# Patient Record
Sex: FEMALE | Race: WHITE | NOT HISPANIC OR LATINO
[De-identification: names, ages, dates, MRNs, and addresses within clinical notes are randomized per-mention and may not be internally consistent; named-entity substitution may affect disease eponyms.]

---

## 2017-03-08 ENCOUNTER — APPOINTMENT (OUTPATIENT)
Dept: INTERNAL MEDICINE | Facility: CLINIC | Age: 82
End: 2017-03-08

## 2017-03-20 ENCOUNTER — APPOINTMENT (OUTPATIENT)
Dept: INTERNAL MEDICINE | Facility: CLINIC | Age: 82
End: 2017-03-20

## 2017-06-27 ENCOUNTER — APPOINTMENT (OUTPATIENT)
Dept: INTERNAL MEDICINE | Facility: CLINIC | Age: 82
End: 2017-06-27

## 2017-08-24 ENCOUNTER — TRANSCRIPTION ENCOUNTER (OUTPATIENT)
Age: 82
End: 2017-08-24

## 2017-09-14 ENCOUNTER — APPOINTMENT (OUTPATIENT)
Dept: INTERNAL MEDICINE | Facility: CLINIC | Age: 82
End: 2017-09-14
Payer: MEDICARE

## 2017-09-14 PROCEDURE — 99214 OFFICE O/P EST MOD 30 MIN: CPT | Mod: 25

## 2017-09-14 PROCEDURE — 36415 COLL VENOUS BLD VENIPUNCTURE: CPT

## 2017-11-11 ENCOUNTER — APPOINTMENT (OUTPATIENT)
Dept: DERMATOLOGY | Facility: CLINIC | Age: 82
End: 2017-11-11
Payer: MEDICARE

## 2017-11-11 PROCEDURE — 17000 DESTRUCT PREMALG LESION: CPT

## 2017-11-11 PROCEDURE — 99214 OFFICE O/P EST MOD 30 MIN: CPT | Mod: 25

## 2017-11-11 PROCEDURE — 17003 DESTRUCT PREMALG LES 2-14: CPT

## 2017-12-11 ENCOUNTER — INPATIENT (INPATIENT)
Facility: HOSPITAL | Age: 82
LOS: 15 days | Discharge: NURSING, REHAB OR LONG-TERM CARE | DRG: 481 | End: 2017-12-27
Attending: INTERNAL MEDICINE | Admitting: HOSPITALIST
Payer: MEDICARE

## 2017-12-11 VITALS — HEIGHT: 66 IN | WEIGHT: 139.99 LBS

## 2017-12-11 DIAGNOSIS — Z29.9 ENCOUNTER FOR PROPHYLACTIC MEASURES, UNSPECIFIED: ICD-10-CM

## 2017-12-11 DIAGNOSIS — I25.10 ATHEROSCLEROTIC HEART DISEASE OF NATIVE CORONARY ARTERY WITHOUT ANGINA PECTORIS: ICD-10-CM

## 2017-12-11 DIAGNOSIS — Z95.1 PRESENCE OF AORTOCORONARY BYPASS GRAFT: Chronic | ICD-10-CM

## 2017-12-11 DIAGNOSIS — R55 SYNCOPE AND COLLAPSE: ICD-10-CM

## 2017-12-11 DIAGNOSIS — S72.001A FRACTURE OF UNSPECIFIED PART OF NECK OF RIGHT FEMUR, INITIAL ENCOUNTER FOR CLOSED FRACTURE: ICD-10-CM

## 2017-12-11 DIAGNOSIS — I25.810 ATHEROSCLEROSIS OF CORONARY ARTERY BYPASS GRAFT(S) WITHOUT ANGINA PECTORIS: ICD-10-CM

## 2017-12-11 DIAGNOSIS — I10 ESSENTIAL (PRIMARY) HYPERTENSION: ICD-10-CM

## 2017-12-11 LAB
ABO RH CONFIRMATION: SIGNIFICANT CHANGE UP
ALBUMIN SERPL ELPH-MCNC: 3 G/DL — LOW (ref 3.3–5.2)
ALP SERPL-CCNC: 47 U/L — SIGNIFICANT CHANGE UP (ref 40–120)
ALT FLD-CCNC: 18 U/L — SIGNIFICANT CHANGE UP
ANION GAP SERPL CALC-SCNC: 17 MMOL/L — SIGNIFICANT CHANGE UP (ref 5–17)
ANION GAP SERPL CALC-SCNC: 17 MMOL/L — SIGNIFICANT CHANGE UP (ref 5–17)
APTT BLD: 28 SEC — SIGNIFICANT CHANGE UP (ref 27.5–37.4)
AST SERPL-CCNC: 24 U/L — SIGNIFICANT CHANGE UP
BASOPHILS # BLD AUTO: 0.1 K/UL — SIGNIFICANT CHANGE UP (ref 0–0.2)
BASOPHILS NFR BLD AUTO: 0.4 % — SIGNIFICANT CHANGE UP (ref 0–2)
BILIRUB SERPL-MCNC: 0.9 MG/DL — SIGNIFICANT CHANGE UP (ref 0.4–2)
BLD GP AB SCN SERPL QL: SIGNIFICANT CHANGE UP
BUN SERPL-MCNC: 31 MG/DL — HIGH (ref 8–20)
BUN SERPL-MCNC: 33 MG/DL — HIGH (ref 8–20)
CALCIUM SERPL-MCNC: 7.8 MG/DL — LOW (ref 8.6–10.2)
CALCIUM SERPL-MCNC: 9.2 MG/DL — SIGNIFICANT CHANGE UP (ref 8.6–10.2)
CHLORIDE SERPL-SCNC: 101 MMOL/L — SIGNIFICANT CHANGE UP (ref 98–107)
CHLORIDE SERPL-SCNC: 104 MMOL/L — SIGNIFICANT CHANGE UP (ref 98–107)
CO2 SERPL-SCNC: 22 MMOL/L — SIGNIFICANT CHANGE UP (ref 22–29)
CO2 SERPL-SCNC: 23 MMOL/L — SIGNIFICANT CHANGE UP (ref 22–29)
CREAT SERPL-MCNC: 0.95 MG/DL — SIGNIFICANT CHANGE UP (ref 0.5–1.3)
CREAT SERPL-MCNC: 1.01 MG/DL — SIGNIFICANT CHANGE UP (ref 0.5–1.3)
EOSINOPHIL # BLD AUTO: 0.2 K/UL — SIGNIFICANT CHANGE UP (ref 0–0.5)
EOSINOPHIL NFR BLD AUTO: 1.8 % — SIGNIFICANT CHANGE UP (ref 0–6)
GLUCOSE SERPL-MCNC: 176 MG/DL — HIGH (ref 70–115)
GLUCOSE SERPL-MCNC: 179 MG/DL — HIGH (ref 70–115)
HCT VFR BLD CALC: 28 % — LOW (ref 37–47)
HCT VFR BLD CALC: 35.9 % — LOW (ref 37–47)
HGB BLD-MCNC: 11.7 G/DL — LOW (ref 12–16)
HGB BLD-MCNC: 9.6 G/DL — LOW (ref 12–16)
INR BLD: 1.05 RATIO — SIGNIFICANT CHANGE UP (ref 0.88–1.16)
LACTATE BLDV-MCNC: 3 MMOL/L — HIGH (ref 0.5–2)
LYMPHOCYTES # BLD AUTO: 16.8 % — LOW (ref 20–55)
LYMPHOCYTES # BLD AUTO: 2.3 K/UL — SIGNIFICANT CHANGE UP (ref 1–4.8)
MCHC RBC-ENTMCNC: 29.3 PG — SIGNIFICANT CHANGE UP (ref 27–31)
MCHC RBC-ENTMCNC: 31.5 PG — HIGH (ref 27–31)
MCHC RBC-ENTMCNC: 32.6 G/DL — SIGNIFICANT CHANGE UP (ref 32–36)
MCHC RBC-ENTMCNC: 34.3 G/DL — SIGNIFICANT CHANGE UP (ref 32–36)
MCV RBC AUTO: 85.4 FL — SIGNIFICANT CHANGE UP (ref 81–99)
MCV RBC AUTO: 96.8 FL — SIGNIFICANT CHANGE UP (ref 81–99)
MONOCYTES # BLD AUTO: 0.6 K/UL — SIGNIFICANT CHANGE UP (ref 0–0.8)
MONOCYTES NFR BLD AUTO: 4.6 % — SIGNIFICANT CHANGE UP (ref 3–10)
NEUTROPHILS # BLD AUTO: 10.2 K/UL — HIGH (ref 1.8–8)
NEUTROPHILS NFR BLD AUTO: 76.1 % — HIGH (ref 37–73)
PLATELET # BLD AUTO: 115 K/UL — LOW (ref 150–400)
PLATELET # BLD AUTO: 229 K/UL — SIGNIFICANT CHANGE UP (ref 150–400)
POTASSIUM SERPL-MCNC: 4.8 MMOL/L — SIGNIFICANT CHANGE UP (ref 3.5–5.3)
POTASSIUM SERPL-MCNC: 5 MMOL/L — SIGNIFICANT CHANGE UP (ref 3.5–5.3)
POTASSIUM SERPL-SCNC: 4.8 MMOL/L — SIGNIFICANT CHANGE UP (ref 3.5–5.3)
POTASSIUM SERPL-SCNC: 5 MMOL/L — SIGNIFICANT CHANGE UP (ref 3.5–5.3)
PROT SERPL-MCNC: 5.2 G/DL — LOW (ref 6.6–8.7)
PROTHROM AB SERPL-ACNC: 11.6 SEC — SIGNIFICANT CHANGE UP (ref 9.8–12.7)
RBC # BLD: 3.28 M/UL — LOW (ref 4.4–5.2)
RBC # BLD: 3.71 M/UL — LOW (ref 4.4–5.2)
RBC # FLD: 12.6 % — SIGNIFICANT CHANGE UP (ref 11–15.6)
RBC # FLD: 17.3 % — HIGH (ref 11–15.6)
SODIUM SERPL-SCNC: 141 MMOL/L — SIGNIFICANT CHANGE UP (ref 135–145)
SODIUM SERPL-SCNC: 143 MMOL/L — SIGNIFICANT CHANGE UP (ref 135–145)
TROPONIN T SERPL-MCNC: <0.01 NG/ML — SIGNIFICANT CHANGE UP (ref 0–0.06)
TYPE + AB SCN PNL BLD: SIGNIFICANT CHANGE UP
WBC # BLD: 13.5 K/UL — HIGH (ref 4.8–10.8)
WBC # BLD: 17.8 K/UL — HIGH (ref 4.8–10.8)
WBC # FLD AUTO: 13.5 K/UL — HIGH (ref 4.8–10.8)
WBC # FLD AUTO: 17.8 K/UL — HIGH (ref 4.8–10.8)

## 2017-12-11 PROCEDURE — 72170 X-RAY EXAM OF PELVIS: CPT | Mod: 26

## 2017-12-11 PROCEDURE — 71010: CPT | Mod: 26

## 2017-12-11 PROCEDURE — 99223 1ST HOSP IP/OBS HIGH 75: CPT | Mod: 57

## 2017-12-11 PROCEDURE — 73610 X-RAY EXAM OF ANKLE: CPT | Mod: 26,LT

## 2017-12-11 PROCEDURE — 72125 CT NECK SPINE W/O DYE: CPT | Mod: 26

## 2017-12-11 PROCEDURE — 76001: CPT | Mod: 26,59

## 2017-12-11 PROCEDURE — 23600 CLTX PROX HUMRL FX W/O MNPJ: CPT | Mod: RT

## 2017-12-11 PROCEDURE — 70450 CT HEAD/BRAIN W/O DYE: CPT | Mod: 26

## 2017-12-11 PROCEDURE — 73552 X-RAY EXAM OF FEMUR 2/>: CPT | Mod: 26,RT

## 2017-12-11 PROCEDURE — 73030 X-RAY EXAM OF SHOULDER: CPT | Mod: 26,RT

## 2017-12-11 PROCEDURE — 93306 TTE W/DOPPLER COMPLETE: CPT | Mod: 26

## 2017-12-11 PROCEDURE — 99222 1ST HOSP IP/OBS MODERATE 55: CPT

## 2017-12-11 PROCEDURE — 99291 CRITICAL CARE FIRST HOUR: CPT

## 2017-12-11 PROCEDURE — 27245 TREAT THIGH FRACTURE: CPT | Mod: RT

## 2017-12-11 PROCEDURE — 74177 CT ABD & PELVIS W/CONTRAST: CPT | Mod: 26

## 2017-12-11 PROCEDURE — 93010 ELECTROCARDIOGRAM REPORT: CPT

## 2017-12-11 RX ORDER — SODIUM CHLORIDE 9 MG/ML
1000 INJECTION, SOLUTION INTRAVENOUS
Qty: 0 | Refills: 0 | Status: DISCONTINUED | OUTPATIENT
Start: 2017-12-12 | End: 2017-12-13

## 2017-12-11 RX ORDER — HEPARIN SODIUM 5000 [USP'U]/ML
5000 INJECTION INTRAVENOUS; SUBCUTANEOUS EVERY 12 HOURS
Qty: 0 | Refills: 0 | Status: DISCONTINUED | OUTPATIENT
Start: 2017-12-11 | End: 2017-12-11

## 2017-12-11 RX ORDER — ACETAMINOPHEN 500 MG
1000 TABLET ORAL ONCE
Qty: 0 | Refills: 0 | Status: COMPLETED | OUTPATIENT
Start: 2017-12-11 | End: 2017-12-11

## 2017-12-11 RX ORDER — MORPHINE SULFATE 50 MG/1
2 CAPSULE, EXTENDED RELEASE ORAL EVERY 4 HOURS
Qty: 0 | Refills: 0 | Status: DISCONTINUED | OUTPATIENT
Start: 2017-12-11 | End: 2017-12-15

## 2017-12-11 RX ORDER — ONDANSETRON 8 MG/1
4 TABLET, FILM COATED ORAL ONCE
Qty: 0 | Refills: 0 | Status: DISCONTINUED | OUTPATIENT
Start: 2017-12-11 | End: 2017-12-12

## 2017-12-11 RX ORDER — TRANEXAMIC ACID 100 MG/ML
635 INJECTION, SOLUTION INTRAVENOUS ONCE
Qty: 0 | Refills: 0 | Status: COMPLETED | OUTPATIENT
Start: 2017-12-11 | End: 2017-12-11

## 2017-12-11 RX ORDER — ONDANSETRON 8 MG/1
4 TABLET, FILM COATED ORAL EVERY 6 HOURS
Qty: 0 | Refills: 0 | Status: DISCONTINUED | OUTPATIENT
Start: 2017-12-12 | End: 2017-12-27

## 2017-12-11 RX ORDER — ENOXAPARIN SODIUM 100 MG/ML
40 INJECTION SUBCUTANEOUS DAILY
Qty: 0 | Refills: 0 | Status: DISCONTINUED | OUTPATIENT
Start: 2017-12-12 | End: 2017-12-27

## 2017-12-11 RX ORDER — CEFAZOLIN SODIUM 1 G
2000 VIAL (EA) INJECTION
Qty: 0 | Refills: 0 | Status: COMPLETED | OUTPATIENT
Start: 2017-12-11 | End: 2017-12-11

## 2017-12-11 RX ORDER — FENTANYL CITRATE 50 UG/ML
25 INJECTION INTRAVENOUS
Qty: 0 | Refills: 0 | Status: DISCONTINUED | OUTPATIENT
Start: 2017-12-11 | End: 2017-12-12

## 2017-12-11 RX ORDER — SODIUM CHLORIDE 9 MG/ML
1000 INJECTION, SOLUTION INTRAVENOUS
Qty: 0 | Refills: 0 | Status: DISCONTINUED | OUTPATIENT
Start: 2017-12-11 | End: 2017-12-12

## 2017-12-11 RX ORDER — DOCUSATE SODIUM 100 MG
100 CAPSULE ORAL THREE TIMES A DAY
Qty: 0 | Refills: 0 | Status: DISCONTINUED | OUTPATIENT
Start: 2017-12-12 | End: 2017-12-27

## 2017-12-11 RX ORDER — HYDROMORPHONE HYDROCHLORIDE 2 MG/ML
0.5 INJECTION INTRAMUSCULAR; INTRAVENOUS; SUBCUTANEOUS EVERY 6 HOURS
Qty: 0 | Refills: 0 | Status: DISCONTINUED | OUTPATIENT
Start: 2017-12-12 | End: 2017-12-19

## 2017-12-11 RX ORDER — DOCUSATE SODIUM 100 MG
100 CAPSULE ORAL
Qty: 0 | Refills: 0 | Status: DISCONTINUED | OUTPATIENT
Start: 2017-12-11 | End: 2017-12-22

## 2017-12-11 RX ORDER — ACETAMINOPHEN 500 MG
650 TABLET ORAL EVERY 6 HOURS
Qty: 0 | Refills: 0 | Status: DISCONTINUED | OUTPATIENT
Start: 2017-12-11 | End: 2017-12-16

## 2017-12-11 RX ORDER — POLYETHYLENE GLYCOL 3350 17 G/17G
17 POWDER, FOR SOLUTION ORAL DAILY
Qty: 0 | Refills: 0 | Status: DISCONTINUED | OUTPATIENT
Start: 2017-12-11 | End: 2017-12-11

## 2017-12-11 RX ORDER — OXYCODONE HYDROCHLORIDE 5 MG/1
10 TABLET ORAL EVERY 4 HOURS
Qty: 0 | Refills: 0 | Status: DISCONTINUED | OUTPATIENT
Start: 2017-12-12 | End: 2017-12-19

## 2017-12-11 RX ORDER — OXYCODONE HYDROCHLORIDE 5 MG/1
5 TABLET ORAL EVERY 4 HOURS
Qty: 0 | Refills: 0 | Status: DISCONTINUED | OUTPATIENT
Start: 2017-12-12 | End: 2017-12-18

## 2017-12-11 RX ADMIN — TRANEXAMIC ACID 212.7 MILLIGRAM(S): 100 INJECTION, SOLUTION INTRAVENOUS at 20:14

## 2017-12-11 RX ADMIN — Medication 1000 MILLIGRAM(S): at 22:20

## 2017-12-11 RX ADMIN — Medication 1000 MILLIGRAM(S): at 03:35

## 2017-12-11 RX ADMIN — MORPHINE SULFATE 2 MILLIGRAM(S): 50 CAPSULE, EXTENDED RELEASE ORAL at 10:13

## 2017-12-11 RX ADMIN — FENTANYL CITRATE 25 MICROGRAM(S): 50 INJECTION INTRAVENOUS at 23:14

## 2017-12-11 RX ADMIN — FENTANYL CITRATE 25 MICROGRAM(S): 50 INJECTION INTRAVENOUS at 22:52

## 2017-12-11 RX ADMIN — MORPHINE SULFATE 2 MILLIGRAM(S): 50 CAPSULE, EXTENDED RELEASE ORAL at 08:52

## 2017-12-11 RX ADMIN — Medication 400 MILLIGRAM(S): at 03:20

## 2017-12-11 RX ADMIN — Medication 100 MILLIGRAM(S): at 23:29

## 2017-12-11 RX ADMIN — FENTANYL CITRATE 25 MICROGRAM(S): 50 INJECTION INTRAVENOUS at 23:15

## 2017-12-11 RX ADMIN — FENTANYL CITRATE 25 MICROGRAM(S): 50 INJECTION INTRAVENOUS at 23:44

## 2017-12-11 RX ADMIN — Medication 400 MILLIGRAM(S): at 21:30

## 2017-12-11 NOTE — BRIEF OPERATIVE NOTE - PRE-OP DX
Other closed nondisplaced fracture of proximal end of right humerus, initial encounter  12/11/2017    Active  Dakota Snowden  Subtrochanteric fracture of right femur, closed, initial encounter  12/11/2017    Active  Dakota Snowden

## 2017-12-11 NOTE — BRIEF OPERATIVE NOTE - PROCEDURE
<<-----Click on this checkbox to enter Procedure Subtrochanteric intramedullary nailing of right femur  12/11/2017  S&N 11.5x380 nail  Active  MLINN

## 2017-12-11 NOTE — PROGRESS NOTE ADULT - PROBLEM SELECTOR PLAN 1
Ortho Consult appreciated.  A CT Scan revealed a comminuted displaced intertrochanteric and subtrochanteric  right hip fracture.  Cardiac Consult appreciated.  The patient may proceed with surgical intervention with moderate   perioperative risk without cardiac contraindication.  Surgery has been rescheduled by Ortho for 1:00 today.

## 2017-12-11 NOTE — CHART NOTE - NSCHARTNOTEFT_GEN_A_CORE
Pt seen by PMD Dr. Nugent, who will continue to follow.  Case discussed.  The hospitalist service will no longer follow.  Please feel free to reconsult if needed [phone].

## 2017-12-11 NOTE — CONSULT NOTE ADULT - ATTENDING COMMENTS
Patient is poor historian.  Reports pain of her right leg.  Unable to cooperate with accurate sensory exam, wiggles all toes.  Lower legs show possible cellulitis.  Tender to touch over lower legs.  Right hip with small amount of ecchymossis.  ROM deferred.  Xrays show right subtroch femur fracture.  Acute versus chronic right proximal humerus fracture - recommend CT for further evaluation.  Plan for Right hip operative fixation once medically optimized.  Bedrest until OR.  DVT ppx per primary team. Patient is poor historian.  Reports pain of her right leg.  Unable to cooperate with accurate sensory exam, wiggles all toes.  Lower legs show possible cellulitis.  Tender to touch over lower legs.  Right hip with small amount of ecchymossis.  ROM deferred.  Xrays show right subtroch femur fracture.  Acute versus chronic right proximal humerus fracture - recommend CT for further evaluation.  Plan for Right hip operative fixation once medically optimized.  Bedrest until OR.  DVT ppx per primary team.    Ortho Trauma Attending:  Agree with above PA note.  Note edited where necessary.  Agree with Dr Alba's assessment. We will plan for the OR today or tomorrow depending on medical optimization.    Dakota Snowden MD  Orthopaedic Trauma Surgery

## 2017-12-11 NOTE — H&P ADULT - NSHPPHYSICALEXAM_GEN_ALL_CORE
Constitutional: Well-developed well nourished female in no acute distress  HEENT: Head is normocephalic and atraumatic, maxillofacial structures stable, no blood or discharge from nares or oral cavity, no campos sign / raccoon eyes, EOMI b/l, pupils 3 mm round and reactive to light b/l, no active drainage or redness  Neck: cervical collar in place, trachea midline  Respiratory: Breath sounds CTA b/l respirations are unlabored, no accessory muscle use, no conversational dyspnea  Cardiovascular: Regular rate & rhythm, +S1, S2  Chest: Chest wall is non-tender to palpation, no subQ emphysema or crepitus palpated  Gastrointestinal: Abdomen soft, non-tender, non-distended, no rebound tenderness / guarding, no ecchymosis or external signs of abdominal trauma  Extremities: limited ROM to RLE due to pain, otherwise normal ROM  Pelvis: stable  Vascular: 2+ radial, femoral, and DP pulses b/l  Neurological: GCS: 15 (4/5/6). A&O x 3; no gross sensory / motor / coordination deficits  Musculoskeletal: 5/5 strength of upper and lower extremities b/l  Back: no C/T/LS spine tenderness to palpation, no step-offs or signs of external trauma to the back

## 2017-12-11 NOTE — PROGRESS NOTE ADULT - SUBJECTIVE AND OBJECTIVE BOX
The patient is a 89y old female who presents with a comminuted displaced intertrochanteric   and subtrochanteric right hip fracture. She says that she has a history of a short term memory   loss and can not remember what happened to her. (Dec 2017 03:21)    PAST MEDICAL HISTORY:  Coronary artery disease  Hypertension  She is hard of hearing.  Peripheral neuropathy with a painful right arm    PAST SURGICAL HISTORY:  CABG  x  3 vessels  in       MEDICATIONS  (STANDING):  docusate sodium 100 milliGRAM(s) Oral two times a day  heparin  Injectable 5000 Unit(s) SubCutaneous every 12 hours    MEDICATIONS  (PRN):  acetaminophen   Tablet. 650 milliGRAM(s) Oral every 6 hours PRN Moderate Pain (4 - 6)  morphine  - Injectable 2 milliGRAM(s) IV Push every 4 hours PRN Severe Pain (7 - 10)      Allergies:    No Known Drug Allergies      SOCIAL HISTORY:  She likes a glass of wine occasionally but she is not a drinker.  She recalls                              smoking in her 20's.  She said that she never used illicit drugs.                      11.7   13.5  )-----------( 229      ( 11 Dec 2017 03:24 )             35.9     PT/INR - ( 11 Dec 2017 04:42 )   PT: 11.6 sec;   INR: 1.05 ratio       PTT - ( 11 Dec 2017 04:42 )  PTT:28.0 sec        141  |  101  |  31.0<H>  ----------------------------<  179<H>  4.8   |  23.0  |  0.95    Ca    9.2      11 Dec 2017 03:24    Height (cm): 167.64 ( @ 03:22)  Weight (kg): 63.5 ( @ 03:22)  BMI (kg/m2): 22.6 ( @ 03:22)    EK2017  Normal sinus rhythm with sinus arrhythmia  Normal ECG    TT ECHO:  None    CT ABDOMEN & PELVIS  -  2017  FINDINGS:  Lung bases: Dependent atelectasis is seen in the lung bases. Mitral valve   annulus calcification is noted.  Colonic diverticulosis is seen without evidence of diverticulitis.   Bones/soft tissues: A comminuted subtrochanteric right hip fracture is   seen with moderate superior impaction of the distal fracture fragment   which medially overrides the proximal fracture fragment. Fracture   extension is seen to the greater and lesser trochanters. Heterogeneous   enlargement of the right vastus musculature and right abductor pauline   muscle is seen likely due to intramuscular hematomas. Degenerative   changes of the spine are seen.  IMPRESSION:  1. No evidence of intra-abdominal trauma.  2. Comminuted, displaced intertrochanteric and subtrochanteric right hip   fracture.    ASA # = 3  Mallampati # = 3  (She has her own teeth on the top                                              and on the bottom.) The patient is a 89y old female who presents with a comminuted displaced intertrochanteric   and subtrochanteric right hip fracture. She says that she has a history of a short term memory   loss and can not remember what happened to her. (Dec 2017 03:21)  She is scheduled to have   an INTRAMEDULLARY NAILING OF THE RIGHT SUBTROCHANTERIC FEMUR FRACTURE.     PAST MEDICAL HISTORY:  Coronary artery disease  Hypertension  She is hard of hearing.  Peripheral neuropathy with a painful right arm    PAST SURGICAL HISTORY:  CABG  x  3 vessels  in       MEDICATIONS  (STANDING):  docusate sodium 100 milliGRAM(s) Oral two times a day  heparin  Injectable 5000 Unit(s) SubCutaneous every 12 hours    MEDICATIONS  (PRN):  acetaminophen   Tablet. 650 milliGRAM(s) Oral every 6 hours PRN Moderate Pain (4 - 6)  morphine  - Injectable 2 milliGRAM(s) IV Push every 4 hours PRN Severe Pain (7 - 10)      Allergies:    No Known Drug Allergies      SOCIAL HISTORY:  She likes a glass of wine occasionally but she is not a drinker.  She recalls                              smoking in her 20's.  She said that she never used illicit drugs.                      11.7   13.5  )-----------( 229      ( 11 Dec 2017 03:24 )             35.9     PT/INR - ( 11 Dec 2017 04:42 )   PT: 11.6 sec;   INR: 1.05 ratio       PTT - ( 11 Dec 2017 04:42 )  PTT:28.0 sec        141  |  101  |  31.0<H>  ----------------------------<  179<H>  4.8   |  23.0  |  0.95    Ca    9.2      11 Dec 2017 03:24    Height (cm): 167.64 ( @ 03:22)  Weight (kg): 63.5 ( @ 03:22)  BMI (kg/m2): 22.6 ( @ 03:22)    EK2017  Normal sinus rhythm with sinus arrhythmia  Normal ECG    TT ECHO:  None    CT ABDOMEN & PELVIS  -  2017  FINDINGS:  Lung bases: Dependent atelectasis is seen in the lung bases. Mitral valve   annulus calcification is noted.  Colonic diverticulosis is seen without evidence of diverticulitis.   Bones/soft tissues: A comminuted subtrochanteric right hip fracture is   seen with moderate superior impaction of the distal fracture fragment   which medially overrides the proximal fracture fragment. Fracture   extension is seen to the greater and lesser trochanters. Heterogeneous   enlargement of the right vastus musculature and right abductor pauline   muscle is seen likely due to intramuscular hematomas. Degenerative   changes of the spine are seen.  IMPRESSION:  1. No evidence of intra-abdominal trauma.  2. Comminuted, displaced intertrochanteric and subtrochanteric right hip   fracture.    ASA # = 3  Mallampati # = 3  (She has her own teeth on the top                                              and on the bottom.)

## 2017-12-11 NOTE — CONSULT NOTE ADULT - SUBJECTIVE AND OBJECTIVE BOX
[facility]                                   [address] [address]                                                      PHONE: [phone]                                                         FAX: [phone]  ----------------------------------------------------------------------------------------------------    89y Female with past medical history as under who presents with a chief complaint of fall from standing.  Patient fell coming out of bathroom.  She was found lying on the floor by EMS, they gave her Fentanyl. She denies any dizziness or lightheadedness prior to her falling. No headache, no neck pain. Pt does c/o left ankle and right hip pain. She reports she has rugs around the house but despite that she fell. She denies any chest pain, shortness of breath. At the time of evaluation, pt reports right hip pain and right shoulder pain. She was last seen in the office in 5/2017 when she had been doing well.    PAST MEDICAL & SURGICAL HISTORY:  CAD (coronary artery disease)  HTN (hypertension)  S/P CABG (coronary artery bypass graft)      No Known Allergies      Review of Systems:   Positive for fall, right hip pain.  Rest of the systems were reviewed and was negative.     Family history: No history of early CAD, sudden cardiac death in family or  congenital heart disease    Social History:  Patient lives by herself in a first floor apartment, has a home aid helping her during the day.   No smoking   No alcohol  No other drug use    Vital Signs Last 24 Hrs  T(F): 98.5  HR: 68  BP: 110/80 mmHg  RR: 18    PHYSICAL EXAM:  Constitutional: Appears well developed, well nourished; alert and co-operative  HEENT:     Head: Normocephalic and atraumatic  Eyes: Conjunctiva normal, No scleral icterus  Mouth/Throat: Mucous membranes are normal. Mucosa are not pale or dry.  Cardiovascular: Normal S1 S2, No murmurs  Respiratory: Lungs clear to auscultation; no crepitations, no wheeze  Gastrointestinal:  Soft, Non-tender, + BS	  Musculoskeletal: Limited ROM on RLE, with mild ankle edema b/l, swelling and ecchymoses noted on her right hip.   Skin: Warm and dry. No cyanosis . No diaphoresis.  Neurologic: Alert oriented to time place and person. No tremor.  Psychiatric: Normal mood and affect, Speech is normal and behavior is normal.        LABS:                        11.7   13.5  )-----------( 229      ( 11 Dec 2017 03:24 )             35.9     12-11    141  |  101  |  31.0<H>  ----------------------------<  179<H>  4.8   |  23.0  |  0.95    Ca    9.2      11 Dec 2017 03:24    PT/INR - ( 11 Dec 2017 04:42 )   PT: 11.6 sec;   INR: 1.05 ratio         PTT - ( 11 Dec 2017 04:42 )  PTT:28.0 sec    RADIOLOGY & ADDITIONAL STUDIES:    < from: CT Abdomen and Pelvis w/ IV Cont (12.11.17 @ 03:59) >  Bones/soft tissues: A comminuted subtrochanteric right hip fracture is seen with moderate superior impaction of the distal fracture fragment which medially overrides the proximal fracture fragment. Fracture extension is seen to the greater and lesser trochanters. Heterogeneous enlargement of the right vastus musculature and right abductor pauline  muscle is seen likely due to intramuscular hematomas.  IMPRESSION:  1. No evidence of intra-abdominal trauma.  2. Comminuted, displaced intertrochanteric and subtrochanteric right hip fracture.  < end of copied text >    CARDIOLOGY TESTING:     ECG: NSR with no ST changes    MEDICATIONS:  MEDICATIONS  (STANDING):  docusate sodium 100 milliGRAM(s) Oral two times a day  heparin  Injectable 5000 Unit(s) SubCutaneous every 12 hours    MEDICATIONS  (PRN):  acetaminophen   Tablet. 650 milliGRAM(s) Oral every 6 hours PRN Moderate Pain (4 - 6)  morphine  - Injectable 2 milliGRAM(s) IV Push every 4 hours PRN Severe Pain (7 - 10)      ASSESSMENT AND PLAN:    89y Female with prior history of HT< HLD, carotid disease, spinal stenosis, h/o CAD s/p CABG, h/o SVT who presented after fall and noted to have R humerus and R hip fracture. Pt active at baseline prior to fall  without any symptoms to suggest angina/anginal equivalent.    -  Can proceed for planned surgical intervention with moderate perioperative risk without cardiac contraindications.  -  Post-op ECG and telemetry.  -  DVT prophylaxis.

## 2017-12-11 NOTE — CONSULT NOTE ADULT - SUBJECTIVE AND OBJECTIVE BOX
ICU Progress Note    HPI:    S:    Pt seen and examined  HD # 1  Pt here for hip Fx s/p fall  Came in as a trauma B this AM  PMHx CAD 3vd s/p CABG on ASA/plavix, HTN    s/p ORIF R hip 2.5 hours, uncomplicated case  IVF 500cc 1u pRBC EBL 450cc  Never hypotensive or on pressors throughout case  Received  1u pRBC and 1u FFP    In PACU ~2 hours post op became bradycardic to 30-40  s, hypotensive, altered  I received a call from Ortho PA for this    This was transient, lasting 2-3 minutes before resolving on own    Pt now awake, HR 70's /80    No c/o chest pain or SOB      Allergies    No Known Allergies    Intolerances        MEDICATIONS  (STANDING):  docusate sodium 100 milliGRAM(s) Oral two times a day  lactated ringers. 1000 milliLiter(s) (30 mL/Hr) IV Continuous <Continuous>    MEDICATIONS  (PRN):  acetaminophen   Tablet. 650 milliGRAM(s) Oral every 6 hours PRN Moderate Pain (4 - 6)  fentaNYL    Injectable 25 MICROGram(s) IV Push every 5 minutes PRN Moderate Pain  morphine  - Injectable 2 milliGRAM(s) IV Push every 4 hours PRN Severe Pain (7 - 10)  ondansetron Injectable 4 milliGRAM(s) IV Push once PRN Nausea and/or Vomiting      Drug Dosing Weight  Height (cm): 167.64 (11 Dec 2017 03:22)  Weight (kg): 63.5 (11 Dec 2017 03:22)  BMI (kg/m2): 22.6 (11 Dec 2017 03:22)  BSA (m2): 1.72 (11 Dec 2017 03:22)      PAST MEDICAL & SURGICAL HISTORY:  CAD (coronary artery disease)  HTN (hypertension)  S/P CABG (coronary artery bypass graft)      FAMILY HISTORY:          ROS: See HPI; otherwise, all systems reviewed and negative.    O:    ICU Vital Signs Last 24 Hrs  T(C): 36.6 (11 Dec 2017 18:15), Max: 37.2 (11 Dec 2017 12:59)  T(F): 97.9 (11 Dec 2017 18:15), Max: 99 (11 Dec 2017 12:59)  HR: 48 (11 Dec 2017 20:00) (48 - 87)  BP: 75/25 (11 Dec 2017 20:00) (61/26 - 168/82)  BP(mean): --  ABP: --  ABP(mean): --  RR: 17 (11 Dec 2017 19:25) (16 - 25)  SpO2: 100% (11 Dec 2017 20:00) (98% - 100%)          I&O's Detail    PE:    Constitutional: Elderly F lying in bed in NAD.   Neck: No JVD, trachea midline.   Respiratory: CTA B/L good BS B/L no W/R/R.  Cardiovascular: S1S2+ RRR no M/R/G.  Gastrointestinal: Soft, NTND.  Extremities: RLE thigh wrapped in ACE bandage  Neurological: Awake, conversive, alert, no gross deficits.  Skin: No rashes, warm, moist.    LABS:    CBC Full  -  ( 11 Dec 2017 19:45 )  WBC Count : 17.8 K/uL  Hemoglobin : 9.6 g/dL  Hematocrit : 28.0 %  Platelet Count - Automated : 115 K/uL  Mean Cell Volume : 85.4 fl  Mean Cell Hemoglobin : 29.3 pg  Mean Cell Hemoglobin Concentration : 34.3 g/dL  Auto Neutrophil # : x  Auto Lymphocyte # : x  Auto Monocyte # : x  Auto Eosinophil # : x  Auto Basophil # : x  Auto Neutrophil % : x  Auto Lymphocyte % : x  Auto Monocyte % : x  Auto Eosinophil % : x  Auto Basophil % : x    12-11    141  |  101  |  31.0<H>  ----------------------------<  179<H>  4.8   |  23.0  |  0.95    Ca    9.2      11 Dec 2017 03:24      PT/INR - ( 11 Dec 2017 04:42 )   PT: 11.6 sec;   INR: 1.05 ratio         PTT - ( 11 Dec 2017 04:42 )  PTT:28.0 sec    CAPILLARY BLOOD GLUCOSE

## 2017-12-11 NOTE — PROGRESS NOTE ADULT - SUBJECTIVE AND OBJECTIVE BOX
89 yo female patient with PMHx of CAD s/p CABG brought in by ambulance from home after mechanical fall, patient was walking to the bathroom when she slipped and fell, after which she reported right hip pain/deformity and right ankle pain. She was found lying on the floor by EMS, they gave her Fentanyl 100 PTA. She denies any dizziness or lightheadedness prior to her falling.    PMHx:  - Hypertension  - Coronary artery disease.  PSHx:  - S/p CABG  FHx:  - No contributory family history  Social Hx:  Patient lives by herself in a first floor apartment, has a home aid helping her during the day. Remote tobacco/EtOH use.    Physical exam:  General: Patient in no acute distress.  HEENT: Normocephalic, atraumatic, PERRL, EOMI b/l, no epistaxis or nasal discharge, no ecchymoses. Trachea midline, cervical collar removed. Mildly impaired hearing.  Respiratory: Normal respiratory rate and effort, no use of accessory muscles, clear to auscultation bilaterally.  Cardiac: Regular rate and rhythm, soft S1/S2, no murmurs, rubs or gallops.  GI: Positive bowel sounds, no rebound or guarding, no tenderness to palpation.  Musculoskeletal: Limited ROM on RLE, with mild ankle edema b/l, swelling and ecchymoses noted on her right hip.   Neurological: Alert and oriented x3, no motor or sensory deficits.  Psych: Good eye contact. Normal affect and speech.                          11.7   13.5  )-----------( 229      ( 11 Dec 2017 03:24 )             35.9   12-11    141  |  101  |  31.0<H>  ----------------------------<  179<H>  4.8   |  23.0  |  0.95    Ca    9.2      11 Dec 2017 03:24    PT/INR - ( 11 Dec 2017 04:42 )   PT: 11.6 sec;   INR: 1.05 ratio    PTT - ( 11 Dec 2017 04:42 )  PTT:28.0 sec  Blood Gas Venous - Lactate: 3.0 mmoL/L (12.11.17 @ 03:24)    < from: CT Abdomen and Pelvis w/ IV Cont (12.11.17 @ 03:59) >  Bones/soft tissues: A comminuted subtrochanteric right hip fracture is seen with moderate superior impaction of the distal fracture fragment which medially overrides the proximal fracture fragment. Fracture extension is seen to the greater and lesser trochanters. Heterogeneous enlargement of the right vastus musculature and right abductor pauline  muscle is seen likely due to intramuscular hematomas.  IMPRESSION:  1. No evidence of intra-abdominal trauma.  2. Comminuted, displaced intertrochanteric and subtrochanteric right hip fracture.  < end of copied text >    < from: CT Cervical Spine No Cont (12.11.17 @ 03:59) >  Head:No acute territorial infarct is demonstrated.There is no evidence of an acute hemorrhage, mass or mass-effect in the posterior fossa or in the supratentorial region.   Cervical spine: No fracture is demonstrated.     IMPRESSION:   1. No acute territorial infarct, hemorrhage, mass effect or calvarial fracture.  2. Multilevel degenerative changes of the cervical spine without evidence of a fracture.  < end of copied text >    X-rays of right ankle and shoulder show no fractures.  CXR: Without infiltrates or effusions, stents noted. 89 yo female patient with PMHx of CAD s/p CABG brought in by ambulance from home after mechanical fall, patient was walking to the bathroom when she slipped and fell, after which she reported right hip pain/deformity and right ankle pain. She was found lying on the floor by EMS, they gave her Fentanyl 100 PTA. She denies any dizziness or lightheadedness prior to her falling.    ROS: No other than as per HPI    PMHx:  - Hypertension  - Coronary artery disease.  PSHx:  - S/p CABG  FHx:  - No contributory family history  Social Hx:  Patient lives by herself in a first floor apartment, has a home aid helping her during the day. Remote tobacco/EtOH use.    Physical exam:  General: Patient in no acute distress.  HEENT: Normocephalic, atraumatic, PERRL, EOMI b/l, no epistaxis or nasal discharge, no ecchymoses. Trachea midline, cervical collar removed. Mildly impaired hearing.  Respiratory: Normal respiratory rate and effort, no use of accessory muscles, clear to auscultation bilaterally.  Cardiac: Regular rate and rhythm, soft S1/S2, no murmurs, rubs or gallops.  GI: Positive bowel sounds, no rebound or guarding, no tenderness to palpation.  Musculoskeletal: Limited ROM on RLE, with mild ankle edema b/l, swelling and ecchymoses noted on her right hip.   Neurological: Alert and oriented x3, no motor or sensory deficits.  Psych: Good eye contact. Normal affect and speech.                          11.7   13.5  )-----------( 229      ( 11 Dec 2017 03:24 )             35.9   12-11    141  |  101  |  31.0<H>  ----------------------------<  179<H>  4.8   |  23.0  |  0.95    Ca    9.2      11 Dec 2017 03:24    PT/INR - ( 11 Dec 2017 04:42 )   PT: 11.6 sec;   INR: 1.05 ratio    PTT - ( 11 Dec 2017 04:42 )  PTT:28.0 sec  Blood Gas Venous - Lactate: 3.0 mmoL/L (12.11.17 @ 03:24)    < from: CT Abdomen and Pelvis w/ IV Cont (12.11.17 @ 03:59) >  Bones/soft tissues: A comminuted subtrochanteric right hip fracture is seen with moderate superior impaction of the distal fracture fragment which medially overrides the proximal fracture fragment. Fracture extension is seen to the greater and lesser trochanters. Heterogeneous enlargement of the right vastus musculature and right abductor pauline  muscle is seen likely due to intramuscular hematomas.  IMPRESSION:  1. No evidence of intra-abdominal trauma.  2. Comminuted, displaced intertrochanteric and subtrochanteric right hip fracture.  < end of copied text >    < from: CT Cervical Spine No Cont (12.11.17 @ 03:59) >  Head:No acute territorial infarct is demonstrated.There is no evidence of an acute hemorrhage, mass or mass-effect in the posterior fossa or in the supratentorial region.   Cervical spine: No fracture is demonstrated.     IMPRESSION:   1. No acute territorial infarct, hemorrhage, mass effect or calvarial fracture.  2. Multilevel degenerative changes of the cervical spine without evidence of a fracture.  < end of copied text >    X-rays of right ankle and shoulder show no fractures.  CXR: Without infiltrates or effusions, stents noted.

## 2017-12-11 NOTE — CONSULT NOTE ADULT - ASSESSMENT
A:    89yFemale  HD # 1    Here for:    1. R hip fx s/p ORIF POD #0  2. Transient symptomatic bradycardia  ---> ?unclear etiology, maybe vasovagal episode v shae-op MI    P/recommendations:    STAT EKG, troponin, chemistries to eval for shae-op MI, electrolyte disturbance  ---> EKG without ischemic changes  Continue resuscitation, consider additional blood products  Serial CBC's  Avoid any AV dutsy blocking agents  Call and discuss with cardiology  Telemetry monitoring    No need for SICU mgmt at this time    Ortho PA (Ihsan) and attending (Isi) aware  SICU attending Chase aware, OK with plan    Please feel free to call back with any concerns

## 2017-12-11 NOTE — BRIEF OPERATIVE NOTE - OPERATION/FINDINGS
comminuted right subtrochanteric femur fracture with free posterior & lateral cortical fragments, above average blood loss likely due to pre-op anticoagulation at baseline.

## 2017-12-11 NOTE — PROGRESS NOTE ADULT - PROBLEM SELECTOR PLAN 1
Patient hemodynamically stable at the moment, will need to trend Hb/Hct.  Patient received fentanyl 100 PTA given by EMS personnel, at the moment pain well controlled, will require pain management, likely with opioids.  Patient to be seen by orthopedic surgery to give plan. Patient hemodynamically stable at the moment, will need to trend Hb/Hct.  Patient received fentanyl 100 PTA given by EMS personnel, at the moment pain well controlled, will require pain management, with IV opioids.  Patient to be seen by orthopedic surgery to give plan.

## 2017-12-11 NOTE — PROGRESS NOTE ADULT - ASSESSMENT
89 yo F s/p mechanical fall at home with comminuted displaced subtrochanteric fracture on the right side. At the moment patient hemodynamically stable. CT of head/cervical spine and abdomen/pelvis negative for acute injury.

## 2017-12-11 NOTE — H&P ADULT - ASSESSMENT
89 y/o F s/p fall at home, found down with subtrochanteric fracture, R side. Hemodynamically stable.     Plan:  -Follow-up imaging 89 y/o F s/p fall at home with subtrochanteric fracture, R side. Hemodynamically stable. CT head, neck, abd/pelvis otherwise negative for acute injury.     Plan:  -Isolated R hip fracture  -Recommend inpatient medicine admission  -Patient seen with attending Dr. Myles 89 y/o F s/p fall at home with subtrochanteric fracture, R side. Hemodynamically stable. CT head, neck, abd/pelvis otherwise negative for acute injury.     Plan:  -Isolated R hip fracture  -Recommend inpatient medicine admission  - Trauma surgery is signing off, please reconsult prn  -Patient seen with attending Dr. Myles

## 2017-12-11 NOTE — PROGRESS NOTE ADULT - SUBJECTIVE AND OBJECTIVE BOX
INTERVAL HPI/OVERNIGHT EVENTS:  HPI:  Patient presents as trauma B. Patient reportedly fell at home from standing while walking to bathroom and was found by family member on ground. No LOC.   Airway intact and stable  Breath sounds clear and equal bilaterally, no respiratory distress  Circulation: 2+ pulses radial, femoral, dorsalis pedis  Disability: GCS 15, alert  Exposure: patient exposed (11 Dec 2017 03:21)    The patient was seen and examined.  She is A&O in [facility], speaking freely with slow but appropriate responses with her daughter at the bedside.  There were no overnight events.  The patient reports that she fell in the bathroom and has pain in her right hip area.  She did not tolerate morphine which has been cancelled and she was provided with Tylenol for pain.  She denies any new complaints.  Her daughter reports that she lives alone with home care assistance in the afternoon and does have some short term memory loss.  Test results, specialist recommendations and the plan of care were discussed with the patient and her daughter.          MEDICATIONS  (STANDING):  docusate sodium 100 milliGRAM(s) Oral two times a day  heparin  Injectable 5000 Unit(s) SubCutaneous every 12 hours    MEDICATIONS  (PRN):  acetaminophen   Tablet. 650 milliGRAM(s) Oral every 6 hours PRN Moderate Pain (4 - 6)  morphine  - Injectable 2 milliGRAM(s) IV Push every 4 hours PRN Severe Pain (7 - 10)      Allergies:  No Known Allergies    Vital Signs Last 24 Hrs  T(C): --  T(F): --  HR: --  BP: --  BP(mean): --  RR: --  SpO2: --    General: WNWD A&O  in [facility] appears comfortable.  HEENT:  NCAT,  PERRLA, EOMI, Nares Patent, Pharynx Clear, Uvula midline,   Neck: no lymphadenopathy, no JVD,   Lungs: Clear to auscultation, no wheezes, no rhonchi, no rales b/l.  CV: RRR,  S1,S2,  no Murmur  ABD: +BS x 4; soft,  nontender, no distension,  No guarding,   MS: FROM throughout.  Muscle tone and strength equal b/l ,  no deformity  EXT:  No cyanosis, Right hip tenderness with min ecchymosis.  B/L Lower extremity +1 edema  Neuro: A&O x 3; CN II- XII grossly intact.  Decreased ROM RLE.  Decreased ROM and sensation B/L LE toes.    LABS:                          11.7   13.5  )-----------( 229      ( 11 Dec 2017 03:24 )             35.9     12-11    141  |  101  |  31.0<H>  ----------------------------<  179<H>  4.8   |  23.0  |  0.95    Ca    9.2      11 Dec 2017 03:24      PT/INR - ( 11 Dec 2017 04:42 )   PT: 11.6 sec;   INR: 1.05 ratio         PTT - ( 11 Dec 2017 04:42 )  PTT:28.0 sec      RADIOLOGY & ADDITIONAL TESTS:  CT A&P:  No evidence of intraabdominal trauma  Comminuted displaced intertrochanteric and subtrochanteric Right hip fracture.  CT C-SPINE:  Degenerative changes without fracture  CT HEAD:   No acute territorial infarct, hemorrhage, mass effect or calvarial fracture. Care transferred to Dr. Nguent

## 2017-12-11 NOTE — ED PROVIDER NOTE - OBJECTIVE STATEMENT
89 yo F on Plavix presents to ED BIBA from home in c-collar c/o left ankle pain and right hip deformity s/p unwitnessed fall. As per EMS, pt was walking out of the bathroom and ended up laying on the floor. Pt unable to recall details of fall and unsure of head trauma but EMS states pt was found laying supine near a table. LOC unknown. EMS gave Fentanyl 100 PTA.

## 2017-12-11 NOTE — PROGRESS NOTE ADULT - PROBLEM SELECTOR PLAN 4
Intermittent pneumatic compression boots.  Unknown if patient currently on antiplatelet agents. Evaluate risk of bleeding versus risk of DVT.

## 2017-12-11 NOTE — CONSULT NOTE ADULT - SUBJECTIVE AND OBJECTIVE BOX
Pt Name: VELIA GARCIA    MRN: [id number]      Patient is a 89y Female presenting to the emergency department with a chief complaint of Right hip pain s/p fall  Patient is a 89y old  Female who presents with a chief complaint of fall from standing (11 Dec 2017 03:21).  Patient fell coming out of bathroom, does not recall all events of fall.  No ha, no neck pain, patient c/o left ankle and right hip pain   .    HEALTH ISSUES - PROBLEM Dx: Right hip fx       .      REVIEW OF SYSTEMS      General: no fevers, no chills	    Skin/Breast:  	  Ophthalmologic:  	  ENMT:	    Respiratory and Thorax:  	  Cardiovascular:	    Gastrointestinal:	    Genitourinary:	    Musculoskeletal:	 right hip, left ankle pain     Neurological:	    Psychiatric:	    Hematology/Lymphatics:	    Endocrine:	    Allergic/Immunologic:	    ROS is otherwise negative.    PAST MEDICAL & SURGICAL HISTORY:  PAST MEDICAL & SURGICAL HISTORY:  CAD (coronary artery disease)  HTN (hypertension)  S/P CABG (coronary artery bypass graft)      Allergies: No Known Allergies      Medications:     FAMILY HISTORY:  : non-contributory    Social History:  denies drug use, lives alone at home    Ambulation: Walking independently With Cane when needs                          11.7   13.5  )-----------( 229      ( 11 Dec 2017 03:24 )             35.9     12-11    141  |  101  |  31.0<H>  ----------------------------<  179<H>  4.8   |  23.0  |  0.95    Ca    9.2      11 Dec 2017 03:24        PHYSICAL EXAM:    Vital Signs Last 24 Hrs  T(C): --  T(F): --  HR: --  BP: --  BP(mean): --  RR: --  SpO2: --  Daily Height in cm: 167.64 (11 Dec 2017 03:22)    Daily     Appearance: Alert, responsive, in no acute distress.    Neck in C collar    ENT Mucus Membranes moist    Resp no labored breathing     Neurological: Sensation is grossly intact to light touch. 5/5 motor function of all extremities. No focal deficits or weaknesses found.    Skin: no rash on visible skin. Skin is clean, dry and intact. No bleeding. No abrasions. No ulcerations.    Vascular: 2+ distal pulses. Cap refill < 2 sec. No signs of venous insuffiency or stasis. No extremity ulcerations. No cyanosis.    Musculoskeletal:         Left Lower Extremity: no swelling to ankle, positive tenderness to lateral mall, FROM, no tenderness to knee or femur       Right Lower Extremity: Positive swelling to thigh, pulse intact, Dorsi/plantar flexion intact, calf soft NT, positive tenderness to hip, no further tenderness to lower extremity, no rotation,  ROM limited due to pain     Imaging Studies: Right femur positive sub troch fx, Left ankle negative for acute fx    A/P:  Pt is a  89y Female with Patient is a 89y old  Female who presents with a chief complaint of fall from standing (11 Dec 2017 03:21)   found to have right sub troch fx     PLAN:   * NPO for OR tomorrow  * IV fluids ordered and to start once NPO  *Routine daily anticoagulation held for OR  * Medical clearance requested for procedure  * Bed rest

## 2017-12-11 NOTE — PROGRESS NOTE ADULT - SUBJECTIVE AND OBJECTIVE BOX
Orthopedic PA Note  Called to floor to eval right thigh swelling by RN  patient with swelling to thigh, soft compressible  pulse via doppler     spoke with Dr. Snowden who is aware, stated to place compressive wrap to leg, order TXA x 1 and will monitor Orthopedic PA Note  Called to floor to eval right thigh swelling by RN  patient with swelling to thigh, soft compressible  pulse via doppler     spoke with Dr. Snowden who is aware, stated to place compressive wrap to leg, order TXA x 1 and will monitor     Patient became bradycardiac as well, spoke with Medicine Dr. Nugent who is aware, also spoke with Dr. Orourke from Cardio who is aware,   SICU PA called to eval patient

## 2017-12-11 NOTE — H&P ADULT - ATTENDING COMMENTS
Delayed documentation.  Patient seen and examined at time of original activation and resident documentation.  89 yo female BIBEMS as trauma activation B s/p fall.  Metabolically and physiologically ok.  Obvious right leg deformity at hip (foreshortened and internally rotated).  Imaging identified an isolated right sided subtrochanteric fracture.  No other traumatic injuries identified.  Ortho consult.  May admit to medicine. Re-consult prn.

## 2017-12-11 NOTE — ED PROVIDER NOTE - MUSCULOSKELETAL, MLM
No spinal tenderness. No step offs. Pelvis stable and non-tender. Tenderness to right thigh with obvious deformity. Tenderness to left ankle. Chest wall non-tender. No crepitus. 1+ pitting edema bilaterally.

## 2017-12-11 NOTE — ED ADULT TRIAGE NOTE - CHIEF COMPLAINT QUOTE
Pt found laying on floor naked with unwitnessed fall, deformity to right hip, pt with dementia, pos blood thinners, unknown head trauma, c-collar in place, code trauma B initiated

## 2017-12-11 NOTE — CONSULT NOTE ADULT - SUBJECTIVE AND OBJECTIVE BOX
[facility] [place] OWEN SIMON MD FACP   TRACEY MARIA MD  Diplomates American Board of Internal Medicine and Infecctious Diseases  [phone]  [phone] [id number]      HPI:  Patient presents as trauma B. Patient reportedly fell at home from standing while walking to bathroom and was found by family member on ground. No LOC.   Airway intact and stable  Breath sounds clear and equal bilaterally, no respiratory distress  Circulation: 2+ pulses radial, femoral, dorsalis pedis  Disability: GCS 15, alert  PT WITH ELVATED WBC  DENIES FEVERS OR CHILLS FELL AY  HOME  PAST MEDICAL & SURGICAL HISTORY:  CAD (coronary artery disease)  HTN (hypertension)  S/P CABG (coronary artery bypass graft)      ANTIBIOTICS NONE      Allergies    No Known Allergies    Intolerances        SOCIAL HISTORY:    FAMILY HISTORY:      Vital Signs Last 24 Hrs  T(C): --98  T(F): --  HR: --  BP: --120/70  BP(mean): --  RR: --  SpO2: --  Drug Dosing Weight  Height (cm): 167.64 (11 Dec 2017 03:22)  Weight (kg): 63.5 (11 Dec 2017 03:22)  BMI (kg/m2): 22.6 (11 Dec 2017 03:22)  BSA (m2): 1.72 (11 Dec 2017 03:22)      REVIEW OF SYSTEMS:    CONSTITUTIONAL:  As per HPI.    HEENT:  Eyes:  No diplopia or blurred vision. ENT:  No earache, sore throat or runny nose.    CARDIOVASCULAR:  No pressure, squeezing, strangling, tightness, heaviness or aching about the chest, neck, axilla or epigastrium.    RESPIRATORY:  No cough, shortness of breath, PND or orthopnea.    GASTROINTESTINAL:  No nausea, vomiting or diarrhea.    GENITOURINARY:  No dysuria, frequency or urgency.    MUSCULOSKELETAL:  As per HPI.    SKIN:  No change in skin, hair or nails.    NEUROLOGIC:  No paresthesias, fasciculations, seizures or weakness.                  PHYSICAL EXAMINATION:    GENERAL: The patient is a well-developed, well-nourished __INNAD     VITAL SIGNS: T(C): --98  HR: --  BP: --120/70  RR: --  SpO2: --  Wt(kg): --    HEENT: Head is normocephalic and atraumatic.  ANICTERIC  NECK: Supple. No carotid bruits.  No lymphadenopathy or thyromegaly.    LUNGS: COARSE BREATH SOUNDS    HEART: Regular rate and rhythm without murmur.    ABDOMEN: Soft, nontender, and nondistended.  Positive bowel sounds.  No hepatosplenomegaly was noted. NO REBOUND NO GUARDING    EXTREMITIES:RIGH HIP PAIN    NEUROLOGIC: NON FOCAL      SKIN: No ulceration or induration present. NO RASH        BLOOD CULTURES       URINE CX          LABS:                        11.7   13.5  )-----------( 229      ( 11 Dec 2017 03:24 )             35.9     12-11    141  |  101  |  31.0<H>  ----------------------------<  179<H>  4.8   |  23.0  |  0.95    Ca    9.2      11 Dec 2017 03:24      PT/INR - ( 11 Dec 2017 04:42 )   PT: 11.6 sec;   INR: 1.05 ratio         PTT - ( 11 Dec 2017 04:42 )  PTT:28.0 sec      RADIOLOGY & ADDITIONAL STUDIES:      ASSESSMENT/PLAN      atient presents as trauma B. Patient reportedly fell at home from standing while walking to bathroom and was found by family member on ground. No LOC.   Airway intact and stable  Breath sounds clear and equal bilaterally, no respiratory distress  Circulation: 2+ pulses radial, femoral, dorsalis pedis  Disability: GCS 15, alert  PT WITH ELVATED WBC  DENIES FEVERS OR CHILLS FELL AT HOME WILL   INCREASED WBC WILL CHECK URINE CX  CARDIOLOGY CLEARANCE CALLED  FOR OR HIP REPAIR   PT IS ALFREDO PALMA SPOKE TO ER LET THEM KNWO TO CALL LOGISTICS   SPOKE  TO DAUGHTER  VIA TELEPHONE            TRACEY PASCUAL MD

## 2017-12-11 NOTE — PROGRESS NOTE ADULT - ASSESSMENT
HPI:  Patient presents as trauma B. Patient reportedly fell at home from standing while walking to bathroom and was found by family member on ground. No LOC.   Airway intact and stable.  A CT Scan showed a comminuted displaced intertrochanteric and subtrochanteric Right hip fracture.  The patient was evaluated with a Ortho Consult.    She was scheduled to have a Right Hip operative fixation tomorrow.  A cardiac consult was provided and they recommended that she can proceed with surgical intervention with moderate perioperative risk without cardiac contraindication.  Ortho has rescheduled the surgery for today at 1:00.  The Plan of Care was discussed with the patient, her family and Dr. Kristopher Chatterjee. Care transferred to Dr. Nugent

## 2017-12-11 NOTE — H&P ADULT - NSHPLABSRESULTS_GEN_ALL_CORE
LABS:                        11.7   13.5  )-----------( 229      ( 11 Dec 2017 03:24 )             35.9     12-11    141  |  101  |  31.0<H>  ----------------------------<  179<H>  4.8   |  23.0  |  0.95    Ca    9.2      11 Dec 2017 03:24

## 2017-12-12 ENCOUNTER — TRANSCRIPTION ENCOUNTER (OUTPATIENT)
Age: 82
End: 2017-12-12

## 2017-12-12 LAB
ANION GAP SERPL CALC-SCNC: 14 MMOL/L — SIGNIFICANT CHANGE UP (ref 5–17)
BASOPHILS # BLD AUTO: 0 K/UL — SIGNIFICANT CHANGE UP (ref 0–0.2)
BASOPHILS NFR BLD AUTO: 0.2 % — SIGNIFICANT CHANGE UP (ref 0–2)
BUN SERPL-MCNC: 34 MG/DL — HIGH (ref 8–20)
CALCIUM SERPL-MCNC: 8 MG/DL — LOW (ref 8.6–10.2)
CHLORIDE SERPL-SCNC: 102 MMOL/L — SIGNIFICANT CHANGE UP (ref 98–107)
CO2 SERPL-SCNC: 26 MMOL/L — SIGNIFICANT CHANGE UP (ref 22–29)
CREAT SERPL-MCNC: 1.08 MG/DL — SIGNIFICANT CHANGE UP (ref 0.5–1.3)
EOSINOPHIL # BLD AUTO: 0 K/UL — SIGNIFICANT CHANGE UP (ref 0–0.5)
EOSINOPHIL NFR BLD AUTO: 0 % — SIGNIFICANT CHANGE UP (ref 0–6)
GLUCOSE SERPL-MCNC: 133 MG/DL — HIGH (ref 70–115)
HCT VFR BLD CALC: 28.2 % — LOW (ref 37–47)
HGB BLD-MCNC: 10 G/DL — LOW (ref 12–16)
LYMPHOCYTES # BLD AUTO: 1.7 K/UL — SIGNIFICANT CHANGE UP (ref 1–4.8)
LYMPHOCYTES # BLD AUTO: 16.2 % — LOW (ref 20–55)
MCHC RBC-ENTMCNC: 29.3 PG — SIGNIFICANT CHANGE UP (ref 27–31)
MCHC RBC-ENTMCNC: 35.5 G/DL — SIGNIFICANT CHANGE UP (ref 32–36)
MCV RBC AUTO: 82.7 FL — SIGNIFICANT CHANGE UP (ref 81–99)
MONOCYTES # BLD AUTO: 1.3 K/UL — HIGH (ref 0–0.8)
MONOCYTES NFR BLD AUTO: 12.5 % — HIGH (ref 3–10)
NEUTROPHILS # BLD AUTO: 7.5 K/UL — SIGNIFICANT CHANGE UP (ref 1.8–8)
NEUTROPHILS NFR BLD AUTO: 70.7 % — SIGNIFICANT CHANGE UP (ref 37–73)
PLATELET # BLD AUTO: 204 K/UL — SIGNIFICANT CHANGE UP (ref 150–400)
POTASSIUM SERPL-MCNC: 4.5 MMOL/L — SIGNIFICANT CHANGE UP (ref 3.5–5.3)
POTASSIUM SERPL-SCNC: 4.5 MMOL/L — SIGNIFICANT CHANGE UP (ref 3.5–5.3)
RBC # BLD: 3.41 M/UL — LOW (ref 4.4–5.2)
RBC # FLD: 17.3 % — HIGH (ref 11–15.6)
SODIUM SERPL-SCNC: 142 MMOL/L — SIGNIFICANT CHANGE UP (ref 135–145)
WBC # BLD: 10.6 K/UL — SIGNIFICANT CHANGE UP (ref 4.8–10.8)
WBC # FLD AUTO: 10.6 K/UL — SIGNIFICANT CHANGE UP (ref 4.8–10.8)

## 2017-12-12 PROCEDURE — 73552 X-RAY EXAM OF FEMUR 2/>: CPT | Mod: 26,RT

## 2017-12-12 PROCEDURE — 99221 1ST HOSP IP/OBS SF/LOW 40: CPT

## 2017-12-12 PROCEDURE — 73030 X-RAY EXAM OF SHOULDER: CPT | Mod: 26,RT

## 2017-12-12 PROCEDURE — 99233 SBSQ HOSP IP/OBS HIGH 50: CPT

## 2017-12-12 RX ORDER — HYDRALAZINE HCL 50 MG
10 TABLET ORAL ONCE
Qty: 0 | Refills: 0 | Status: COMPLETED | OUTPATIENT
Start: 2017-12-12 | End: 2017-12-12

## 2017-12-12 RX ORDER — CEFAZOLIN SODIUM 1 G
2000 VIAL (EA) INJECTION
Qty: 0 | Refills: 0 | Status: COMPLETED | OUTPATIENT
Start: 2017-12-12 | End: 2017-12-12

## 2017-12-12 RX ADMIN — OXYCODONE HYDROCHLORIDE 5 MILLIGRAM(S): 5 TABLET ORAL at 10:10

## 2017-12-12 RX ADMIN — MORPHINE SULFATE 2 MILLIGRAM(S): 50 CAPSULE, EXTENDED RELEASE ORAL at 05:25

## 2017-12-12 RX ADMIN — Medication 100 MILLIGRAM(S): at 06:16

## 2017-12-12 RX ADMIN — Medication 100 MILLIGRAM(S): at 05:08

## 2017-12-12 RX ADMIN — OXYCODONE HYDROCHLORIDE 10 MILLIGRAM(S): 5 TABLET ORAL at 16:45

## 2017-12-12 RX ADMIN — OXYCODONE HYDROCHLORIDE 5 MILLIGRAM(S): 5 TABLET ORAL at 00:45

## 2017-12-12 RX ADMIN — HYDROMORPHONE HYDROCHLORIDE 0.5 MILLIGRAM(S): 2 INJECTION INTRAMUSCULAR; INTRAVENOUS; SUBCUTANEOUS at 10:55

## 2017-12-12 RX ADMIN — OXYCODONE HYDROCHLORIDE 5 MILLIGRAM(S): 5 TABLET ORAL at 01:15

## 2017-12-12 RX ADMIN — SODIUM CHLORIDE 100 MILLILITER(S): 9 INJECTION, SOLUTION INTRAVENOUS at 20:25

## 2017-12-12 RX ADMIN — FENTANYL CITRATE 25 MICROGRAM(S): 50 INJECTION INTRAVENOUS at 00:11

## 2017-12-12 RX ADMIN — ENOXAPARIN SODIUM 40 MILLIGRAM(S): 100 INJECTION SUBCUTANEOUS at 12:16

## 2017-12-12 RX ADMIN — OXYCODONE HYDROCHLORIDE 10 MILLIGRAM(S): 5 TABLET ORAL at 15:48

## 2017-12-12 RX ADMIN — HYDROMORPHONE HYDROCHLORIDE 0.5 MILLIGRAM(S): 2 INJECTION INTRAMUSCULAR; INTRAVENOUS; SUBCUTANEOUS at 20:40

## 2017-12-12 RX ADMIN — Medication 10 MILLIGRAM(S): at 00:50

## 2017-12-12 RX ADMIN — OXYCODONE HYDROCHLORIDE 5 MILLIGRAM(S): 5 TABLET ORAL at 09:12

## 2017-12-12 RX ADMIN — HYDROMORPHONE HYDROCHLORIDE 0.5 MILLIGRAM(S): 2 INJECTION INTRAMUSCULAR; INTRAVENOUS; SUBCUTANEOUS at 20:24

## 2017-12-12 RX ADMIN — MORPHINE SULFATE 2 MILLIGRAM(S): 50 CAPSULE, EXTENDED RELEASE ORAL at 05:08

## 2017-12-12 RX ADMIN — Medication 100 MILLIGRAM(S): at 12:16

## 2017-12-12 RX ADMIN — MORPHINE SULFATE 2 MILLIGRAM(S): 50 CAPSULE, EXTENDED RELEASE ORAL at 23:51

## 2017-12-12 RX ADMIN — HYDROMORPHONE HYDROCHLORIDE 0.5 MILLIGRAM(S): 2 INJECTION INTRAMUSCULAR; INTRAVENOUS; SUBCUTANEOUS at 10:43

## 2017-12-12 RX ADMIN — FENTANYL CITRATE 25 MICROGRAM(S): 50 INJECTION INTRAVENOUS at 00:41

## 2017-12-12 NOTE — PROGRESS NOTE ADULT - ASSESSMENT
1. elderly female with traumatic right hip and humerus fx-sp right hip repair.  2. hypertension  3. hx of cad with prior cabg and preserved LV function on this adm's echo.

## 2017-12-12 NOTE — PROGRESS NOTE ADULT - SUBJECTIVE AND OBJECTIVE BOX
[facility] is a 89y Female with HPI:  Patient presents as trauma B. Patient reportedly fell at home from standing while walking to bathroom and was found by family member on ground. No LOC.   Airway intact and stable   PT WITH FRACTURE OF LEFT HIP WENT TO OR YESTERDAY HAD SOME  BLEEDING  PT WITH DIFFICULT TO DETECT DISTAL EXT PULSES     Allergies:  No Known Allergies      Medications:  acetaminophen   Tablet. 650 milliGRAM(s) Oral every 6 hours PRN  docusate sodium 100 milliGRAM(s) Oral three times a day  docusate sodium 100 milliGRAM(s) Oral two times a day  enoxaparin Injectable 40 milliGRAM(s) SubCutaneous daily  HYDROmorphone  Injectable 0.5 milliGRAM(s) IV Push every 6 hours PRN  lactated ringers. 1000 milliLiter(s) IV Continuous <Continuous>  morphine  - Injectable 2 milliGRAM(s) IV Push every 4 hours PRN  ondansetron Injectable 4 milliGRAM(s) IV Push every 6 hours PRN  oxyCODONE    IR 10 milliGRAM(s) Oral every 4 hours PRN  oxyCODONE    IR 5 milliGRAM(s) Oral every 4 hours PRN      ANTIBIOTICS:         Review of Systems: - Negative except as mentioned above     Physical Exam:  ICU Vital Signs Last 24 Hrs  T(C): 37.1 (12 Dec 2017 05:02), Max: 37.5 (11 Dec 2017 22:00)  T(F): 98.8 (12 Dec 2017 05:02), Max: 99.5 (11 Dec 2017 22:00)  HR: 95 (12 Dec 2017 05:02) (48 - 710)  BP: 140/60 (12 Dec 2017 05:02) (61/26 - 196/83)  BP(mean): --  ABP: --  ABP(mean): --  RR: 18 (12 Dec 2017 05:02) (12 - 25)  SpO2: 98% (12 Dec 2017 05:02) (98% - 100%)    GEN: NAD, pleasant  HEENT: normocephalic and atraumatic. EOMI. JOSE...  NECK: Supple. No carotid bruits.  No lymphadenopathy or thyromegaly.  LUNGS: Clear to auscultation.  HEART: Regular rate and rhythm without murmur.  ABDOMEN: Soft, nontender, and nondistended.  Positive bowel sounds.  No hepatosplenomegaly was noted.  NO REBOUND NO GUARDING  EXTREMITIES POST OP DRESSINGS EDEMA RIGHT LEG   RIGTH ARM IN SLG  NEUROLOGIC:  AWAKE MID DEMENTIA    SKIN: No ulceration or induration present.      Labs:

## 2017-12-12 NOTE — PROGRESS NOTE ADULT - ASSESSMENT
Patient presents as trauma B. Patient reportedly fell at home from standing while walking to bathroom and was found by family member on ground. No LOC.   Airway intact and stable   PT WITH FRACTURE OF LEFT HIP WENT TO OR YESTERDAY HAD SOME  BLEEDING  HAD BEEN ON PLAVIX AT HOME  PT WITH DIFFICULT TO DETECT DISTAL EXT PULSES   WILL HAVE VASCULAR EVAL  OVERALL STABLE  WILL D/W ORTHO

## 2017-12-12 NOTE — PHYSICAL THERAPY INITIAL EVALUATION ADULT - TRANSFER SAFETY CONCERNS NOTED: SIT/STAND, REHAB EVAL
NWB right UE, unsteadiness throughout, attempted 2x, unable to tolerate for more than ten seconds due to pain

## 2017-12-12 NOTE — PHYSICAL THERAPY INITIAL EVALUATION ADULT - RANGE OF MOTION EXAMINATION, REHAB EVAL
right UE not tested, right LE: grossly observed due to pain: right hip lacks 50% AROM, knee lacks 50% AROM

## 2017-12-12 NOTE — CONSULT NOTE ADULT - ATTENDING COMMENTS
Pt seen and evaluated. Agree w PA note above. In brief, this is a 89 year old female w recent trauma and R hip fracture s/p ORIF. Concerns about acute ischemia to RLE due to absent pulses and LE pain. On exam, pt with pain all over and LE pain is not acute. Present before surgery  Pt w absent pedal pulses bilaterally, but with biphasic signals.  Feet warm to touch and pink. No ulcers or CLI. Has mild b/l edema c/w venous stasis  Likely with chronic PAD  No acute vascular intervention necessary  f/u as outpatient

## 2017-12-12 NOTE — PROGRESS NOTE ADULT - SUBJECTIVE AND OBJECTIVE BOX
Patient seen and eval at bedside. Patient pleasantly confused. Patient does c/o some pain right hip s/p right hip IMN POD#1.     Vital Signs Last 24 Hrs  T(C): 37.1 (12 Dec 2017 05:02), Max: 37.5 (11 Dec 2017 22:00)  T(F): 98.8 (12 Dec 2017 05:02), Max: 99.5 (11 Dec 2017 22:00)  HR: 88 (12 Dec 2017 08:30) (48 - 710)  BP: 142/74 (12 Dec 2017 08:30) (61/26 - 196/83)  BP(mean): --  RR: 20 (12 Dec 2017 08:30) (12 - 25)  SpO2: 97% (12 Dec 2017 08:30) (97% - 100%)    PE: NAD, alert awake, confused  Right LE: Dressing C/D/I, + swelling right thigh, compressible soft  EHL/TA/GS intact however limited due to pain and mental status  Gross SILT distally, unable to elicit DP/PT pulses with doppler on right and left foot  calf soft, NT B/L                          10.0   10.6  )-----------( 204      ( 12 Dec 2017 05:51 )             28.2       A/P: s/p right hip IMN POD#1, s/p PRBC, platelet transfusion  ·	D/w Dr. Malave - vascular consult placed  ·	PT-WBAT  ·	DVT propx  ·	pain control  ·	Will follow

## 2017-12-12 NOTE — PHYSICAL THERAPY INITIAL EVALUATION ADULT - CRITERIA FOR SKILLED THERAPEUTIC INTERVENTIONS
functional limitations in following categories/rehab potential/anticipated discharge recommendation/predicted duration of therapy intervention/impairments found/therapy frequency

## 2017-12-12 NOTE — PHYSICAL THERAPY INITIAL EVALUATION ADULT - MANUAL MUSCLE TESTING RESULTS, REHAB EVAL
no strength deficits were identified/right UE not tested, right LE: hip 2/5, knee 2/5, ankle 3+/5, left LE: 3/5 throughout

## 2017-12-12 NOTE — PROGRESS NOTE ADULT - SUBJECTIVE AND OBJECTIVE BOX
Chief Complaint: fell and fx'd left arm and hip    HPI: sp hip fx repair. PMH+ for cad and cabg-echo this admission shows hyperdynamic LV with heavy MAC. Pt co pain right leg.    PAST MEDICAL & SURGICAL HISTORY:  CAD (coronary artery disease)  HTN (hypertension)  S/P CABG (coronary artery bypass graft)      PREVIOUS DIAGNOSTIC TESTING:      ECHO  FINDINGS: see above    STRESS  FINDINGS:    CATHETERIZATION  FINDINGS:    MEDICATIONS  (STANDING):  docusate sodium 100 milliGRAM(s) Oral three times a day  docusate sodium 100 milliGRAM(s) Oral two times a day  enoxaparin Injectable 40 milliGRAM(s) SubCutaneous daily  lactated ringers. 1000 milliLiter(s) (100 mL/Hr) IV Continuous <Continuous>    MEDICATIONS  (PRN):  acetaminophen   Tablet. 650 milliGRAM(s) Oral every 6 hours PRN Moderate Pain (4 - 6)  HYDROmorphone  Injectable 0.5 milliGRAM(s) IV Push every 6 hours PRN breakthrough  morphine  - Injectable 2 milliGRAM(s) IV Push every 4 hours PRN Severe Pain (7 - 10)  ondansetron Injectable 4 milliGRAM(s) IV Push every 6 hours PRN Nausea and/or Vomiting  oxyCODONE    IR 10 milliGRAM(s) Oral every 4 hours PRN Moderate Pain  oxyCODONE    IR 5 milliGRAM(s) Oral every 4 hours PRN Mild Pain      FAMILY HISTORY:      ROS: Negative other than as mentioned in HPI.    Vital Signs Last 24 Hrs  T(C): 37.1 (12 Dec 2017 10:09), Max: 37.5 (11 Dec 2017 22:00)  T(F): 98.7 (12 Dec 2017 10:09), Max: 99.5 (11 Dec 2017 22:00)  HR: 96 (12 Dec 2017 10:09) (48 - 710)  BP: 160/80 manually left arm (12 Dec 2017 10:09) (61/26 - 196/83)  BP(mean): --  RR: 14- nonlabored (12 Dec 2017 10:09) (12 - 25)  SpO2: 98% (12 Dec 2017 10:09) (97% - 100%)    PHYSICAL EXAM:  General: Appears well developed, well nourished alert and cooperative.  Elederly alert afebrile F c/o pain right leg.    HEENT: Head; normocephalic, atraumatic.  Eyes;   Pupils reactive, cornea wnl.  Neck; Supple, no nodes adenopathy, no NVD or carotid bruit or thyromegaly.  CARDIOVASCULAR; No murmur, rub, gallop or lift. Normal S1 and S2.  LUNGS; No rales, rhonchi or wheeze. Normal breath sounds bilaterally.  ABDOMEN ; Soft, nontender without mass or organomegaly. bowel sounds normoactive.  EXTREMITIES; No clubbing, cyanosis or edema. Distal pulses not felt.   SKIN; warm and dry with normal turgor.  NEURO; Alert/oriented x 3/normal motor exam. right arm in sling and right leg bandaged from hip surgery.  PSYCH; normal affect.            INTERPRETATION OF TELEMETRY:    ECG: sr lvh    I&O's Detail    11 Dec 2017 07:01  -  12 Dec 2017 07:00  --------------------------------------------------------  IN:    lactated ringers.: 650 mL    lactated ringers.: 30 mL    Oral Fluid: 60 mL    Packed Red Blood Cells: 346 mL    Platelets - Single Donor: 243 mL    Solution: 300 mL  Total IN: 1629 mL    OUT:    Indwelling Catheter - Urethral: 225 mL  Total OUT: 225 mL    Total NET: 1404 mL          LABS:                        10.0   10.6  )-----------( 204      ( 12 Dec 2017 05:51 )             28.2     12-12    142  |  102  |  34.0<H>  ----------------------------<  133<H>  4.5   |  26.0  |  1.08    Ca    8.0<L>      12 Dec 2017 05:51    TPro  5.2<L>  /  Alb  3.0<L>  /  TBili  0.9  /  DBili  x   /  AST  24  /  ALT  18  /  AlkPhos  47  12-11    CARDIAC MARKERS ( 11 Dec 2017 20:46 )  x     / <0.01 ng/mL / x     / x     / x          PT/INR - ( 11 Dec 2017 04:42 )   PT: 11.6 sec;   INR: 1.05 ratio         PTT - ( 11 Dec 2017 04:42 )  PTT:28.0 sec    I&O's Summary    11 Dec 2017 07:01  -  12 Dec 2017 07:00  --------------------------------------------------------  IN: 1629 mL / OUT: 225 mL / NET: 1404 mL        RADIOLOGY & ADDITIONAL STUDIES:

## 2017-12-12 NOTE — PHYSICAL THERAPY INITIAL EVALUATION ADULT - GENERAL OBSERVATIONS, REHAB EVAL
pt received supine in bed, NAD, agreeable to PT pt received supine in bed, NAD, agreeable to PT, right UE sling

## 2017-12-12 NOTE — PHYSICAL THERAPY INITIAL EVALUATION ADULT - PERTINENT HX OF CURRENT PROBLEM, REHAB EVAL
pt presents to [facility] due to s/p fall, syncope and collapse, right intertrochanteric, pt presents to [facility] due to s/p fall, syncope and collapse, right intertrochanteric/subtrochanteric hip fx, right humerus fx, s/p right hip IMN 12/11 to pt presents to [facility] due to s/p fall, syncope and collapse, right intertrochanteric/subtrochanteric hip fx, right humerus fx, s/p right hip IMN 12/11

## 2017-12-13 LAB
ANION GAP SERPL CALC-SCNC: 12 MMOL/L — SIGNIFICANT CHANGE UP (ref 5–17)
BUN SERPL-MCNC: 28 MG/DL — HIGH (ref 8–20)
CALCIUM SERPL-MCNC: 8.3 MG/DL — LOW (ref 8.6–10.2)
CHLORIDE SERPL-SCNC: 101 MMOL/L — SIGNIFICANT CHANGE UP (ref 98–107)
CO2 SERPL-SCNC: 26 MMOL/L — SIGNIFICANT CHANGE UP (ref 22–29)
CREAT SERPL-MCNC: 0.9 MG/DL — SIGNIFICANT CHANGE UP (ref 0.5–1.3)
GLUCOSE SERPL-MCNC: 117 MG/DL — HIGH (ref 70–115)
POTASSIUM SERPL-MCNC: 4 MMOL/L — SIGNIFICANT CHANGE UP (ref 3.5–5.3)
POTASSIUM SERPL-SCNC: 4 MMOL/L — SIGNIFICANT CHANGE UP (ref 3.5–5.3)
SODIUM SERPL-SCNC: 139 MMOL/L — SIGNIFICANT CHANGE UP (ref 135–145)

## 2017-12-13 PROCEDURE — 99233 SBSQ HOSP IP/OBS HIGH 50: CPT

## 2017-12-13 PROCEDURE — 93971 EXTREMITY STUDY: CPT | Mod: 26,RT

## 2017-12-13 RX ORDER — ACETAMINOPHEN 500 MG
1000 TABLET ORAL ONCE
Qty: 0 | Refills: 0 | Status: COMPLETED | OUTPATIENT
Start: 2017-12-13 | End: 2017-12-13

## 2017-12-13 RX ADMIN — ENOXAPARIN SODIUM 40 MILLIGRAM(S): 100 INJECTION SUBCUTANEOUS at 22:21

## 2017-12-13 RX ADMIN — Medication 400 MILLIGRAM(S): at 11:42

## 2017-12-13 RX ADMIN — OXYCODONE HYDROCHLORIDE 10 MILLIGRAM(S): 5 TABLET ORAL at 22:21

## 2017-12-13 RX ADMIN — Medication 100 MILLIGRAM(S): at 17:55

## 2017-12-13 RX ADMIN — Medication 100 MILLIGRAM(S): at 05:15

## 2017-12-13 RX ADMIN — MORPHINE SULFATE 2 MILLIGRAM(S): 50 CAPSULE, EXTENDED RELEASE ORAL at 05:30

## 2017-12-13 RX ADMIN — Medication 100 MILLIGRAM(S): at 12:58

## 2017-12-13 RX ADMIN — MORPHINE SULFATE 2 MILLIGRAM(S): 50 CAPSULE, EXTENDED RELEASE ORAL at 05:15

## 2017-12-13 RX ADMIN — MORPHINE SULFATE 2 MILLIGRAM(S): 50 CAPSULE, EXTENDED RELEASE ORAL at 00:04

## 2017-12-13 RX ADMIN — Medication 100 MILLIGRAM(S): at 22:21

## 2017-12-13 RX ADMIN — OXYCODONE HYDROCHLORIDE 10 MILLIGRAM(S): 5 TABLET ORAL at 23:15

## 2017-12-13 RX ADMIN — Medication 1000 MILLIGRAM(S): at 12:42

## 2017-12-13 NOTE — PROGRESS NOTE ADULT - SUBJECTIVE AND OBJECTIVE BOX
[facility], [facility]                                   [address] [place] [address]                                                      PHONE: [phone]                                                         FAX: [phone]  ----------------------------------------------------------------------------------------------------    Pt seen and examined. FU for CAD    Overnight events/Complaints: Pt c/o right arm and right leg pain.     Vital Signs Last 24 Hrs  T(C): 36.9 (13 Dec 2017 05:02), Max: 37.4 (12 Dec 2017 15:24)  T(F): 98.5 (13 Dec 2017 05:02), Max: 99.4 (12 Dec 2017 15:24)  HR: 90 (13 Dec 2017 08:30) (90 - 110)  BP: 136/52 (13 Dec 2017 05:02) (136/52 - 175/72)  BP(mean): --  RR: 18 (13 Dec 2017 05:02) (18 - 21)  SpO2: 96% (13 Dec 2017 05:02) (94% - 98%)  I&O's Summary    12 Dec 2017 07:01  -  13 Dec 2017 07:00  --------------------------------------------------------  IN: 3050 mL / OUT: 1025 mL / NET: 2025 mL        PHYSICAL EXAM:  Constitutional: Appears fatigued  HEENT:     Head: Normocephalic and atraumatic  Eyes: Conjunctiva normal, No scleral icterus  Mouth/Throat: Mucous membranes are normal. Mucosa are not pale or dry.  Cardiovascular: Normal S1 S2, No murmurs  Respiratory: Lungs clear to auscultation; no crepitations, no wheeze  Gastrointestinal:  Soft, Non-tender, + BS	  Musculoskeletal: Limited ROM on RLE, with mild ankle edema b/l,   Skin: Warm and dry. No cyanosis . No diaphoresis.  Neurologic: Alert oriented to time place and person. No tremor.  Psychiatric: Normal mood and affect, Speech is normal and behavior is normal.        LABS:                        10.0   10.6  )-----------( 204      ( 12 Dec 2017 05:51 )             28.2     12-13    139  |  101  |  28.0<H>  ----------------------------<  117<H>  4.0   |  26.0  |  0.90    Ca    8.3<L>      13 Dec 2017 05:45    TPro  5.2<L>  /  Alb  3.0<L>  /  TBili  0.9  /  DBili  x   /  AST  24  /  ALT  18  /  AlkPhos  47  12-11    CARDIAC MARKERS ( 11 Dec 2017 20:46 )  x     / <0.01 ng/mL / x     / x     / x              RADIOLOGY & ADDITIONAL STUDIES:    CARDIOLOGY TESTING:     Telemetry: NSR with APCs- Overall HR elevated    Echocardiogram:  < from: TTE Echo Complete w/Doppler (12.11.17 @ 09:37) >   Summary:   1. Technically adequate study.   2. Decreased left ventricular internal cavity size.   3. Normal global left ventricular systolic function.   4. Left ventricular ejection fraction, by visual estimation, is 70 to   75%.   5. Spectral Doppler shows pseudonormal pattern of left ventricular   myocardial filling (Grade II diastolic dysfunction).   6. Normal right ventricular size and function.   7. Mildly enlarged left atrium.   8. Elevated mean left atrial pressure.   9. Mildly increased LV wall thickness.  10. Sclerotic aortic valve with normal opening.  11. Moderately decreased posterior mitral leaflet mobility.  12. Mild mitral valve regurgitation.  13. Intra-atrial septal aneurysm.  14. Severe mitral annular calcification.  15. There is no evidence of pericardial effusion.     MD Isaías Electronically signed on 12/11/2017 at 3:27:27 PM    < end of copied text >    MEDICATIONS:  MEDICATIONS  (STANDING):  docusate sodium 100 milliGRAM(s) Oral three times a day  docusate sodium 100 milliGRAM(s) Oral two times a day  enoxaparin Injectable 40 milliGRAM(s) SubCutaneous daily  lactated ringers. 1000 milliLiter(s) (100 mL/Hr) IV Continuous <Continuous>    MEDICATIONS  (PRN):  acetaminophen   Tablet. 650 milliGRAM(s) Oral every 6 hours PRN Moderate Pain (4 - 6)  HYDROmorphone  Injectable 0.5 milliGRAM(s) IV Push every 6 hours PRN breakthrough  morphine  - Injectable 2 milliGRAM(s) IV Push every 4 hours PRN Severe Pain (7 - 10)  ondansetron Injectable 4 milliGRAM(s) IV Push every 6 hours PRN Nausea and/or Vomiting  oxyCODONE    IR 10 milliGRAM(s) Oral every 4 hours PRN Moderate Pain  oxyCODONE    IR 5 milliGRAM(s) Oral every 4 hours PRN Mild Pain      ASSESSMENT AND PLAN:    89y Female with prior history of [facility], [facility]                                   [address] [place] [address]                                                      PHONE: [phone]                                                         FAX: [phone]  ----------------------------------------------------------------------------------------------------    Pt seen and examined. FU for CAD    Overnight events/Complaints: Pt c/o right arm and right leg pain.     Vital Signs Last 24 Hrs  T(C): 36.9 (13 Dec 2017 05:02), Max: 37.4 (12 Dec 2017 15:24)  T(F): 98.5 (13 Dec 2017 05:02), Max: 99.4 (12 Dec 2017 15:24)  HR: 90 (13 Dec 2017 08:30) (90 - 110)  BP: 136/52 (13 Dec 2017 05:02) (136/52 - 175/72)  BP(mean): --  RR: 18 (13 Dec 2017 05:02) (18 - 21)  SpO2: 96% (13 Dec 2017 05:02) (94% - 98%)  I&O's Summary    12 Dec 2017 07:01  -  13 Dec 2017 07:00  --------------------------------------------------------  IN: 3050 mL / OUT: 1025 mL / NET: 2025 mL        PHYSICAL EXAM:  Constitutional: Appears fatigued  HEENT:     Head: Normocephalic and atraumatic  Eyes: Conjunctiva normal, No scleral icterus  Mouth/Throat: Mucous membranes are normal. Mucosa are not pale or dry.  Cardiovascular: Normal S1 S2, No murmurs  Respiratory: Lungs clear to auscultation; no crepitations, no wheeze  Gastrointestinal:  Soft, Non-tender, + BS	  Musculoskeletal: Limited ROM on RLE, with mild ankle edema b/l,   Skin: Warm and dry. No cyanosis . No diaphoresis.  Neurologic: Alert oriented to time place and person. No tremor.  Psychiatric: Normal mood and affect, Speech is normal and behavior is normal.        LABS:                        10.0   10.6  )-----------( 204      ( 12 Dec 2017 05:51 )             28.2     12-13    139  |  101  |  28.0<H>  ----------------------------<  117<H>  4.0   |  26.0  |  0.90    Ca    8.3<L>      13 Dec 2017 05:45    TPro  5.2<L>  /  Alb  3.0<L>  /  TBili  0.9  /  DBili  x   /  AST  24  /  ALT  18  /  AlkPhos  47  12-11    CARDIAC MARKERS ( 11 Dec 2017 20:46 )  x     / <0.01 ng/mL / x     / x     / x              RADIOLOGY & ADDITIONAL STUDIES:    CARDIOLOGY TESTING:     Telemetry: NSR with APCs- Overall HR elevated    Echocardiogram:  < from: TTE Echo Complete w/Doppler (12.11.17 @ 09:37) >   Summary:   1. Technically adequate study.   2. Decreased left ventricular internal cavity size.   3. Normal global left ventricular systolic function.   4. Left ventricular ejection fraction, by visual estimation, is 70 to   75%.   5. Spectral Doppler shows pseudonormal pattern of left ventricular   myocardial filling (Grade II diastolic dysfunction).   6. Normal right ventricular size and function.   7. Mildly enlarged left atrium.   8. Elevated mean left atrial pressure.   9. Mildly increased LV wall thickness.  10. Sclerotic aortic valve with normal opening.  11. Moderately decreased posterior mitral leaflet mobility.  12. Mild mitral valve regurgitation.  13. Intra-atrial septal aneurysm.  14. Severe mitral annular calcification.  15. There is no evidence of pericardial effusion.     MD Isaías Electronically signed on 12/11/2017 at 3:27:27 PM    < end of copied text >    MEDICATIONS:  MEDICATIONS  (STANDING):  docusate sodium 100 milliGRAM(s) Oral three times a day  docusate sodium 100 milliGRAM(s) Oral two times a day  enoxaparin Injectable 40 milliGRAM(s) SubCutaneous daily  lactated ringers. 1000 milliLiter(s) (100 mL/Hr) IV Continuous <Continuous>    MEDICATIONS  (PRN):  acetaminophen   Tablet. 650 milliGRAM(s) Oral every 6 hours PRN Moderate Pain (4 - 6)  HYDROmorphone  Injectable 0.5 milliGRAM(s) IV Push every 6 hours PRN breakthrough  morphine  - Injectable 2 milliGRAM(s) IV Push every 4 hours PRN Severe Pain (7 - 10)  ondansetron Injectable 4 milliGRAM(s) IV Push every 6 hours PRN Nausea and/or Vomiting  oxyCODONE    IR 10 milliGRAM(s) Oral every 4 hours PRN Moderate Pain  oxyCODONE    IR 5 milliGRAM(s) Oral every 4 hours PRN Mild Pain      ASSESSMENT AND PLAN:    89y Female with prior history of HTN, HLD, carotid disease, spinal stenosis, h/o CAD s/p CABG, h/o SVT who presented after fall and noted to have R humerus and R hip fracture. s/p ORIF.    -  Telemetry with no further arrhythmias- ? elevated HR related to pain,  -  DVT prophylaxis.  -  d/c iv fluids once po intake resumed  -  Ambulate  -  Would resume outpt Cozaar if BP remains elevated in AM.

## 2017-12-13 NOTE — PROGRESS NOTE ADULT - ASSESSMENT
Patient presents as trauma B. Patient reportedly fell at home from standing while walking to bathroom and was found by family member on ground. No LOC.   Airway intact and stable   PT WITH FRACTURE OF LEFT HIP WENT TO OR YESTERDAY HAD SOME  BLEEDING  HAD BEEN ON PLAVIX AT HOME  PT WITH DIFFICULT TO DETECT DISTAL EXT PULSES   PT WITH CHRONIC STASIS DERMATITIS  OGF BOTH LOWER  EXTREMITIES  OVERALL STABLE  WILL D/W ORTHO  EVENTUAL LAURA

## 2017-12-13 NOTE — PROGRESS NOTE ADULT - SUBJECTIVE AND OBJECTIVE BOX
[facility] is a 89y Female with HPI:  Patient presents as trauma B. Patient reportedly fell at home from standing while walking to bathroom and was found by family member on ground. No LOC.   Airway intact and stable   PT WITH FRACTURE OF LEFT HIP WENT TO OR YESTERDAY HAD SOME  BLEEDING  PT WITH DIFFICULT TO DETECT DISTAL EXT PULSES     Allergies:  No Known Allergies      Medications:  acetaminophen   Tablet. 650 milliGRAM(s) Oral every 6 hours PRN  docusate sodium 100 milliGRAM(s) Oral three times a day  docusate sodium 100 milliGRAM(s) Oral two times a day  enoxaparin Injectable 40 milliGRAM(s) SubCutaneous daily  HYDROmorphone  Injectable 0.5 milliGRAM(s) IV Push every 6 hours PRN  lactated ringers. 1000 milliLiter(s) IV Continuous <Continuous>  morphine  - Injectable 2 milliGRAM(s) IV Push every 4 hours PRN  ondansetron Injectable 4 milliGRAM(s) IV Push every 6 hours PRN  oxyCODONE    IR 10 milliGRAM(s) Oral every 4 hours PRN  oxyCODONE    IR 5 milliGRAM(s) Oral every 4 hours PRN      ANTIBIOTICS:         Review of Systems: - Negative except as mentioned above     Physical Exam:   ICU Vital Signs Last 24 Hrs  T(C): 36.9 (13 Dec 2017 10:04), Max: 37.4 (12 Dec 2017 15:24)  T(F): 98.4 (13 Dec 2017 10:04), Max: 99.4 (12 Dec 2017 15:24)  HR: 89 (13 Dec 2017 10:04) (89 - 110)  BP: 157/38 (13 Dec 2017 10:04) (136/52 - 157/38)  BP(mean): --  ABP: --  ABP(mean): --  RR: 17 (13 Dec 2017 10:04) (17 - 21)  SpO2: 96% (13 Dec 2017 05:02) (94% - 98%)      GEN: NAD, pleasant  HEENT: normocephalic and atraumatic. EOMI. JOSE...  NECK: Supple. No carotid bruits.  No lymphadenopathy or thyromegaly.  LUNGS: Clear to auscultation.  HEART: Regular rate and rhythm without murmur.  ABDOMEN: Soft, nontender, and nondistended.  Positive bowel sounds.  No hepatosplenomegaly was noted.  NO REBOUND NO GUARDING  EXTREMITIES POST OP DRESSINGS EDEMA RIGHT LEG   RIGTH ARM IN SLG  NEUROLOGIC:  AWAKE MID DEMENTIA    SKIN: No ulceration or induration present.      Labs:

## 2017-12-13 NOTE — PROGRESS NOTE ADULT - SUBJECTIVE AND OBJECTIVE BOX
Patient seen and eval at bedside. Patient seems less confused than yesterday, c/o right calf and ankle pain that she describes as "nerve pain." Patient notes right shoulder is sore.  Denies CP, SOB, dizziness.    Vital Signs Last 24 Hrs  T(C): 36.9 (13 Dec 2017 05:02), Max: 37.4 (12 Dec 2017 15:24)  T(F): 98.5 (13 Dec 2017 05:02), Max: 99.4 (12 Dec 2017 15:24)  HR: 90 (13 Dec 2017 05:02) (88 - 110)  BP: 136/52 (13 Dec 2017 05:02) (136/52 - 175/72)  BP(mean): --  RR: 18 (13 Dec 2017 05:02) (18 - 21)  SpO2: 96% (13 Dec 2017 05:02) (94% - 98%)    PE: NAD, alert awake  Right UE: Sling applied  ain/pin/intrinsics intact,  intact  SILT rad/uln/med distrib, Rad pulse 2+  Right LE: Dressings C/D/I, incisions healing very well, no erythema, bleeding or drainage  Plantar/dorsiflexion intact, Gross SILT distally  Right Alberto soft, tender to palpation throughout with some mild cellulitis distal 1/3 aspect of right leg  Left calf soft, NT    A/P: 89 yo F s/p right hip IMN, right prox humerus fx  ·	pain control  ·	DVT propx: Lov  ·	PT - WBAT right LE, NWB right UE  ·	D.w primary team, recommend US doppler of right LE however may be neurogenic as well, r//o DVT  ·	Cont care as per primary team Patient seen and eval at bedside. Patient seems less confused than yesterday, c/o right calf and ankle pain that she describes as "nerve pain." Patient notes right shoulder is sore.  Denies CP, SOB, dizziness.    Vital Signs Last 24 Hrs  T(C): 36.9 (13 Dec 2017 05:02), Max: 37.4 (12 Dec 2017 15:24)  T(F): 98.5 (13 Dec 2017 05:02), Max: 99.4 (12 Dec 2017 15:24)  HR: 90 (13 Dec 2017 05:02) (88 - 110)  BP: 136/52 (13 Dec 2017 05:02) (136/52 - 175/72)  BP(mean): --  RR: 18 (13 Dec 2017 05:02) (18 - 21)  SpO2: 96% (13 Dec 2017 05:02) (94% - 98%)    PE: NAD, alert awake  Right UE: Sling applied  ain/pin/intrinsics intact,  intact  SILT rad/uln/med distrib, Rad pulse 2+  Right LE: Dressings C/D/I, incisions healing very well, no erythema, bleeding or drainage  Plantar/dorsiflexion intact, Gross SILT distally  Right Alberto soft, tender to palpation throughout with some mild cellulitis distal 1/3 aspect of right leg  Left calf soft, NT    labs pending    A/P: 89 yo F s/p right hip IMN, right prox humerus fx  ·	pain control  ·	DVT propx: Lov  ·	PT - WBAT right LE, NWB right UE  ·	Vascular consult appreciated  ·	f/u labs  ·	D.w primary team, recommend US doppler of right LE however may be neurogenic as well, r//o DVT  ·	Cont care as per primary team

## 2017-12-14 LAB
ANION GAP SERPL CALC-SCNC: 10 MMOL/L — SIGNIFICANT CHANGE UP (ref 5–17)
BUN SERPL-MCNC: 26 MG/DL — HIGH (ref 8–20)
CALCIUM SERPL-MCNC: 8.1 MG/DL — LOW (ref 8.6–10.2)
CHLORIDE SERPL-SCNC: 102 MMOL/L — SIGNIFICANT CHANGE UP (ref 98–107)
CO2 SERPL-SCNC: 27 MMOL/L — SIGNIFICANT CHANGE UP (ref 22–29)
CREAT SERPL-MCNC: 0.92 MG/DL — SIGNIFICANT CHANGE UP (ref 0.5–1.3)
GLUCOSE SERPL-MCNC: 113 MG/DL — SIGNIFICANT CHANGE UP (ref 70–115)
POTASSIUM SERPL-MCNC: 3.9 MMOL/L — SIGNIFICANT CHANGE UP (ref 3.5–5.3)
POTASSIUM SERPL-SCNC: 3.9 MMOL/L — SIGNIFICANT CHANGE UP (ref 3.5–5.3)
SODIUM SERPL-SCNC: 139 MMOL/L — SIGNIFICANT CHANGE UP (ref 135–145)

## 2017-12-14 PROCEDURE — 99233 SBSQ HOSP IP/OBS HIGH 50: CPT

## 2017-12-14 RX ADMIN — MORPHINE SULFATE 2 MILLIGRAM(S): 50 CAPSULE, EXTENDED RELEASE ORAL at 09:47

## 2017-12-14 RX ADMIN — Medication 100 MILLIGRAM(S): at 16:01

## 2017-12-14 RX ADMIN — OXYCODONE HYDROCHLORIDE 10 MILLIGRAM(S): 5 TABLET ORAL at 15:59

## 2017-12-14 RX ADMIN — ENOXAPARIN SODIUM 40 MILLIGRAM(S): 100 INJECTION SUBCUTANEOUS at 21:09

## 2017-12-14 RX ADMIN — MORPHINE SULFATE 2 MILLIGRAM(S): 50 CAPSULE, EXTENDED RELEASE ORAL at 10:10

## 2017-12-14 RX ADMIN — MORPHINE SULFATE 2 MILLIGRAM(S): 50 CAPSULE, EXTENDED RELEASE ORAL at 22:00

## 2017-12-14 RX ADMIN — MORPHINE SULFATE 2 MILLIGRAM(S): 50 CAPSULE, EXTENDED RELEASE ORAL at 21:10

## 2017-12-14 RX ADMIN — OXYCODONE HYDROCHLORIDE 10 MILLIGRAM(S): 5 TABLET ORAL at 17:07

## 2017-12-14 RX ADMIN — Medication 100 MILLIGRAM(S): at 21:09

## 2017-12-14 RX ADMIN — Medication 100 MILLIGRAM(S): at 06:18

## 2017-12-14 NOTE — PROGRESS NOTE ADULT - SUBJECTIVE AND OBJECTIVE BOX
[facility] is a 89y Female with HPI:  Patient presents as trauma B. Patient reportedly fell at home from standing while walking to bathroom and was found by family member on ground. No LOC.   Airway intact and stable   PT WITH FRACTURE OF LEFT HIP WENT TO OR YESTERDAY HAD SOME  BLEEDING  PT WITH DIFFICULT TO DETECT DISTAL EXT PULSES   PAIN RIGHT FOOT    Allergies:  No Known Allergies      Medications:  acetaminophen   Tablet. 650 milliGRAM(s) Oral every 6 hours PRN  docusate sodium 100 milliGRAM(s) Oral three times a day  docusate sodium 100 milliGRAM(s) Oral two times a day  enoxaparin Injectable 40 milliGRAM(s) SubCutaneous daily  HYDROmorphone  Injectable 0.5 milliGRAM(s) IV Push every 6 hours PRN  lactated ringers. 1000 milliLiter(s) IV Continuous <Continuous>  morphine  - Injectable 2 milliGRAM(s) IV Push every 4 hours PRN  ondansetron Injectable 4 milliGRAM(s) IV Push every 6 hours PRN  oxyCODONE    IR 10 milliGRAM(s) Oral every 4 hours PRN  oxyCODONE    IR 5 milliGRAM(s) Oral every 4 hours PRN      ANTIBIOTICS:         Review of Systems: - Negative except as mentioned above     Physical Exam:    Vital Signs Last 24 Hrs  T(C): 37.6 (14 Dec 2017 09:49), Max: 37.6 (13 Dec 2017 17:15)  T(F): 99.6 (14 Dec 2017 09:49), Max: 99.6 (13 Dec 2017 17:15)  HR: 102 (14 Dec 2017 11:16) (96 - 108)  BP: 139/72 (14 Dec 2017 11:16) (122/62 - 160/50)  BP(mean): --  RR: 18 (14 Dec 2017 11:16) (17 - 22)  SpO2: 95% (14 Dec 2017 09:49) (94% - 98%)      GEN: NAD, pleasant  HEENT: normocephalic and atraumatic. EOMI. JOSE...  NECK: Supple. No carotid bruits.  No lymphadenopathy or thyromegaly.  LUNGS: Clear to auscultation.  HEART: Regular rate and rhythm without murmur.  ABDOMEN: Soft, nontender, and nondistended.  Positive bowel sounds.  No hepatosplenomegaly was noted.  NO REBOUND NO GUARDING  EXTREMITIES POST OP DRESSINGS EDEMA RIGHT LEG   RIGTH ARM IN SLG  NEUROLOGIC:  AWAKE MID DEMENTIA    SKIN: No ulceration or induration present.      Labs:

## 2017-12-14 NOTE — PROGRESS NOTE ADULT - ASSESSMENT
Patient presents as trauma B. Patient reportedly fell at home from standing while walking to bathroom and was found by family member on ground. No LOC.   Airway intact and stable   PT WITH FRACTURE OF LEFT HIP WENT TO OR   HAD DROP IN HB ACUTE BLOOD LOSS ANEMIA NOW STABLE  HAD BEEN ON PLAVIX AT HOME  PT WITH DIFFICULT TO DETECT DISTAL EXT PULSES   PT WITH CHRONIC STASIS DERMATITIS  OF BOTH LOWER  EXTREMITIES  AND PAINFUL RIGHT FOOT  HAD SVT NO  ON CARDZIEM  OVERALL STABLE  WILL D/W ORTHO  EVENTUAL LAURA

## 2017-12-14 NOTE — PROGRESS NOTE ADULT - SUBJECTIVE AND OBJECTIVE BOX
[facility], [facility]                                   [address] [place] [address]                                                      PHONE: [phone]                                                         FAX: [phone]  ----------------------------------------------------------------------------------------------------    Pt seen and examined. FU for CAD  Brief episode of SVT/PAF on monitor.    Overnight events/Complaints: Pt c/o right arm and right leg pain. Eating somewhat but needed assistance.    Vital Signs Last 24 Hrs  T(C): 36.8 (14 Dec 2017 06:13), Max: 37.6 (13 Dec 2017 17:15)  T(F): 98.2 (14 Dec 2017 06:13), Max: 99.6 (13 Dec 2017 17:15)  HR: 97 (14 Dec 2017 06:13) (89 - 102)  BP: 122/62 (14 Dec 2017 06:13) (122/62 - 160/50)  BP(mean): --  RR: 17 (14 Dec 2017 06:13) (17 - 19)  SpO2: 94% (14 Dec 2017 06:13) (94% - 98%)  I&O's Summary    13 Dec 2017 07:01  -  14 Dec 2017 07:00  --------------------------------------------------------  IN: 1080 mL / OUT: 650 mL / NET: 430 mL        PHYSICAL EXAM:  PHYSICAL EXAM:  Constitutional: Appears fatigued  HEENT:     Head: Normocephalic and atraumatic  Eyes: Conjunctiva normal, No scleral icterus  Mouth/Throat: Mucous membranes are normal. Mucosa are not pale or dry.  Cardiovascular: Normal S1 S2, No murmurs  Respiratory: Lungs clear to auscultation; no crepitations, no wheeze  Gastrointestinal:  Soft, Non-tender, + BS	  Musculoskeletal: Limited ROM on RLE, with mild ankle edema b/l, Right arm in sling  Skin: Warm and dry. No cyanosis . No diaphoresis.   Neurologic: Alert oriented to time place and person. No tremor.  Psychiatric: Flat mood and affect, Speech is normal and behavior is normal.        LABS:    12-14    139  |  102  |  26.0<H>  ----------------------------<  113  3.9   |  27.0  |  0.92    Ca    8.1<L>      14 Dec 2017 06:06      RADIOLOGY & ADDITIONAL STUDIES:    CARDIOLOGY TESTING:     Telemetry: NSR with APCs. brief SVT/PAF    Echocardiogram:  < from: TTE Echo Complete w/Doppler (12.11.17 @ 09:37) >   Summary:   1. Technically adequate study.   2. Decreased left ventricular internal cavity size.   3. Normal global left ventricular systolic function.   4. Left ventricular ejection fraction, by visual estimation, is 70 to   75%.   5. Spectral Doppler shows pseudonormal pattern of left ventricular   myocardial filling (Grade II diastolic dysfunction).   6. Normal right ventricular size and function.   7. Mildly enlarged left atrium.   8. Elevated mean left atrial pressure.   9. Mildly increased LV wall thickness.  10. Sclerotic aortic valve with normal opening.  11. Moderately decreased posterior mitral leaflet mobility.  12. Mild mitral valve regurgitation.  13. Intra-atrial septal aneurysm.  14. Severe mitral annular calcification.  15. There is no evidence of pericardial effusion.     MD Isaías Electronically signed on 12/11/2017 at 3:27:27 PM    < end of copied text >      MEDICATIONS:  MEDICATIONS  (STANDING):  docusate sodium 100 milliGRAM(s) Oral three times a day  docusate sodium 100 milliGRAM(s) Oral two times a day  enoxaparin Injectable 40 milliGRAM(s) SubCutaneous daily    MEDICATIONS  (PRN):  acetaminophen   Tablet. 650 milliGRAM(s) Oral every 6 hours PRN Moderate Pain (4 - 6)  HYDROmorphone  Injectable 0.5 milliGRAM(s) IV Push every 6 hours PRN breakthrough  morphine  - Injectable 2 milliGRAM(s) IV Push every 4 hours PRN Severe Pain (7 - 10)  ondansetron Injectable 4 milliGRAM(s) IV Push every 6 hours PRN Nausea and/or Vomiting  oxyCODONE    IR 10 milliGRAM(s) Oral every 4 hours PRN Moderate Pain  oxyCODONE    IR 5 milliGRAM(s) Oral every 4 hours PRN Mild Pain      ASSESSMENT AND PLAN:    89y Female with prior history of HTN, HLD, carotid disease, spinal stenosis, h/o CAD s/p CABG, h/o SVT who presented after fall and noted to have R humerus and R hip fracture. s/p ORIF. Brief episode of SVT/PAF on telemetry    -  Will add low dose Cardizem for HR control. Given age, comorbidities and limited functional status, not a candidate for long term anticoagulation even if brief SVT is related to PAF.  -  Ambulate  -  Pain control

## 2017-12-14 NOTE — PROGRESS NOTE ADULT - SUBJECTIVE AND OBJECTIVE BOX
Patient seen and eval at bedside. Patient seems less confused than yesterday, c/o right calf and ankle pain that she describes as "nerve pain." Patient notes right shoulder is sore.  Denies CP, SOB, dizziness.    Vital Signs Last 24 Hrs  T(C): 37.6 (14 Dec 2017 09:49), Max: 37.6 (13 Dec 2017 17:15)  T(F): 99.6 (14 Dec 2017 09:49), Max: 99.6 (13 Dec 2017 17:15)  HR: 102 (14 Dec 2017 11:16) (96 - 108)  BP: 139/72 (14 Dec 2017 11:16) (122/62 - 160/50)  BP(mean): --  RR: 18 (14 Dec 2017 11:16) (17 - 22)  SpO2: 95% (14 Dec 2017 09:49) (94% - 98%)    PE: NAD, alert awake  Right UE: Sling applied  finger movement intact,  intact   Rad pulse 2+, sensation intact  Right LE: Dressings C/D/I, incisions healing  well, no erythema, bleeding or drainage  Plantar/dorsiflexion intact, Gross SILT distally  Right Alberto soft, tender to palpation throughout with some mild cellulitis distal 1/3 aspect of right leg  Left calf soft, NT    labs pending    A/P: 89 yo F s/p right hip IMN pod #3, right prox humerus fx  pain control  DVT propx: Lov  PT - WBAT right LE, NWB right UE  Vascular consult appreciated

## 2017-12-15 ENCOUNTER — TRANSCRIPTION ENCOUNTER (OUTPATIENT)
Age: 82
End: 2017-12-15

## 2017-12-15 DIAGNOSIS — S42.201A UNSPECIFIED FRACTURE OF UPPER END OF RIGHT HUMERUS, INITIAL ENCOUNTER FOR CLOSED FRACTURE: ICD-10-CM

## 2017-12-15 DIAGNOSIS — R26.9 UNSPECIFIED ABNORMALITIES OF GAIT AND MOBILITY: ICD-10-CM

## 2017-12-15 DIAGNOSIS — G89.18 OTHER ACUTE POSTPROCEDURAL PAIN: ICD-10-CM

## 2017-12-15 PROCEDURE — 99233 SBSQ HOSP IP/OBS HIGH 50: CPT

## 2017-12-15 RX ORDER — GABAPENTIN 400 MG/1
100 CAPSULE ORAL THREE TIMES A DAY
Qty: 0 | Refills: 0 | Status: DISCONTINUED | OUTPATIENT
Start: 2017-12-15 | End: 2017-12-27

## 2017-12-15 RX ORDER — ACETAMINOPHEN 500 MG
975 TABLET ORAL EVERY 8 HOURS
Qty: 0 | Refills: 0 | Status: DISCONTINUED | OUTPATIENT
Start: 2017-12-15 | End: 2017-12-27

## 2017-12-15 RX ADMIN — HYDROMORPHONE HYDROCHLORIDE 0.5 MILLIGRAM(S): 2 INJECTION INTRAMUSCULAR; INTRAVENOUS; SUBCUTANEOUS at 19:47

## 2017-12-15 RX ADMIN — Medication 100 MILLIGRAM(S): at 05:22

## 2017-12-15 RX ADMIN — Medication 100 MILLIGRAM(S): at 13:22

## 2017-12-15 RX ADMIN — Medication 100 MILLIGRAM(S): at 21:41

## 2017-12-15 RX ADMIN — Medication 975 MILLIGRAM(S): at 15:19

## 2017-12-15 RX ADMIN — ENOXAPARIN SODIUM 40 MILLIGRAM(S): 100 INJECTION SUBCUTANEOUS at 11:37

## 2017-12-15 RX ADMIN — OXYCODONE HYDROCHLORIDE 5 MILLIGRAM(S): 5 TABLET ORAL at 05:22

## 2017-12-15 RX ADMIN — Medication 975 MILLIGRAM(S): at 22:00

## 2017-12-15 RX ADMIN — HYDROMORPHONE HYDROCHLORIDE 0.5 MILLIGRAM(S): 2 INJECTION INTRAMUSCULAR; INTRAVENOUS; SUBCUTANEOUS at 13:00

## 2017-12-15 RX ADMIN — MORPHINE SULFATE 2 MILLIGRAM(S): 50 CAPSULE, EXTENDED RELEASE ORAL at 10:42

## 2017-12-15 RX ADMIN — OXYCODONE HYDROCHLORIDE 5 MILLIGRAM(S): 5 TABLET ORAL at 06:00

## 2017-12-15 RX ADMIN — GABAPENTIN 100 MILLIGRAM(S): 400 CAPSULE ORAL at 21:41

## 2017-12-15 RX ADMIN — MORPHINE SULFATE 2 MILLIGRAM(S): 50 CAPSULE, EXTENDED RELEASE ORAL at 10:12

## 2017-12-15 RX ADMIN — GABAPENTIN 100 MILLIGRAM(S): 400 CAPSULE ORAL at 13:22

## 2017-12-15 RX ADMIN — Medication 975 MILLIGRAM(S): at 15:49

## 2017-12-15 RX ADMIN — Medication 975 MILLIGRAM(S): at 21:41

## 2017-12-15 RX ADMIN — HYDROMORPHONE HYDROCHLORIDE 0.5 MILLIGRAM(S): 2 INJECTION INTRAMUSCULAR; INTRAVENOUS; SUBCUTANEOUS at 13:31

## 2017-12-15 RX ADMIN — HYDROMORPHONE HYDROCHLORIDE 0.5 MILLIGRAM(S): 2 INJECTION INTRAMUSCULAR; INTRAVENOUS; SUBCUTANEOUS at 20:00

## 2017-12-15 NOTE — DISCHARGE NOTE ADULT - PLAN OF CARE
The patient will be seen in the office between 2-3 weeks for wound check. Sutures/Staples/Tape will be removed at that time. Patient may shower after post-op day #5. The dressing is to be removed on 12/21/17. The patient will contact the office if the wound becomes red, has increasing pain, develops bleeding or discharge, an injury occurs, or has other concerns. The patient will continue PT for gait training.  The patient will continue LOVENOX for 4 weeks for DVTP. The patient will take Oxycodone and Tylenol for pain control and titrate according to prescription and patient needs. The patient is FULL weight bearing of the right lower extremity.  Non- weight bearing of the right upper extremity. rehab

## 2017-12-15 NOTE — PROGRESS NOTE ADULT - SUBJECTIVE AND OBJECTIVE BOX
Patient seen and eval at bedside.   Patient resting comfortably in bed in NAD  Patient states she has not been able to participate much with PT secondary to pain  Denies CP, SOB, dizziness.    Vital Signs Last 24 Hrs  T(C): 37.1 (15 Dec 2017 05:20), Max: 37.6 (14 Dec 2017 09:49)  T(F): 98.7 (15 Dec 2017 05:20), Max: 99.6 (14 Dec 2017 09:49)  HR: 98 (15 Dec 2017 05:20) (98 - 108)  BP: 142/80 (15 Dec 2017 05:20) (126/80 - 142/80)  BP(mean): --  RR: 18 (15 Dec 2017 05:20) (18 - 22)  SpO2: 97% (15 Dec 2017 05:20) (95% - 97%)    PE: NAD, alert awake  Right UE: Sling applied  finger movement intact,  intact   Rad pulse 2+, sensation to light touch intact  Right LE: Dressings C/D/I  Plantar/dorsiflexion intact, Gross SILT distally  Right Alberto soft, tender to palpation throughout with some mild cellulitis distal 1/3 aspect of right leg  Left calf soft, NT    A/P: 89 yo F s/p right hip IMN pod #4, right prox humerus fx- non operative treatment  pain control as clinically indicated  DVT prophylaxis as prescribed with use of compression devices  Continue PT - WBAT right LE, NWB right UE  Vascular consult appreciated  Continue care per primary team  Ortho stable  DC planning- LAURA

## 2017-12-15 NOTE — PROGRESS NOTE ADULT - SUBJECTIVE AND OBJECTIVE BOX
[place] [facility]ARDIOVASCULAR - [facility], THE [facility]ART [facility]                                   [address] [place] [address]                                                      PHONE: [phone]                                                         FAX: [phone]  [email or web address]  ---------------------------------------------------------------------------------------------------------------------------------    Overnight events/patient complaints:  Pt feels well and legs are itchy    No Known Allergies    MEDICATIONS  (STANDING):  acetaminophen   Tablet. 975 milliGRAM(s) Oral every 8 hours  diltiazem    milliGRAM(s) Oral daily  docusate sodium 100 milliGRAM(s) Oral three times a day  docusate sodium 100 milliGRAM(s) Oral two times a day  enoxaparin Injectable 40 milliGRAM(s) SubCutaneous daily  gabapentin 100 milliGRAM(s) Oral three times a day    MEDICATIONS  (PRN):  acetaminophen   Tablet. 650 milliGRAM(s) Oral every 6 hours PRN Moderate Pain (4 - 6)  HYDROmorphone  Injectable 0.5 milliGRAM(s) IV Push every 6 hours PRN breakthrough  ondansetron Injectable 4 milliGRAM(s) IV Push every 6 hours PRN Nausea and/or Vomiting  oxyCODONE    IR 10 milliGRAM(s) Oral every 4 hours PRN Moderate Pain  oxyCODONE    IR 5 milliGRAM(s) Oral every 4 hours PRN Mild Pain      Vital Signs Last 24 Hrs  T(C): 36.7 (15 Dec 2017 11:18), Max: 37.2 (14 Dec 2017 15:58)  T(F): 98 (15 Dec 2017 11:18), Max: 98.9 (14 Dec 2017 15:58)  HR: 83 (15 Dec 2017 11:18) (83 - 102)  BP: 132/68 (15 Dec 2017 11:18) (126/80 - 142/80)  BP(mean): --  RR: 18 (15 Dec 2017 11:18) (18 - 18)  SpO2: 97% (15 Dec 2017 05:20) (97% - 97%)  ICU Vital Signs Last 24 Hrs  ALFREDO PALMA  I&O's Detail    14 Dec 2017 07:01  -  15 Dec 2017 07:00  --------------------------------------------------------  IN:    Oral Fluid: 390 mL  Total IN: 390 mL    OUT:    Indwelling Catheter - Urethral: 1150 mL  Total OUT: 1150 mL    Total NET: -760 mL        Drug Dosing Weight  ALFREDO PALMA      PHYSICAL EXAM:  General: Appears well developed, well nourished alert and cooperative.  HEENT: Head; normocephalic, atraumatic.  Eyes: Pupils reactive, cornea wnl.  Neck: Supple, no nodes adenopathy, no NVD or carotid bruit or thyromegaly.  CARDIOVASCULAR: Normal S1 and S2, No murmur, rub, gallop or lift.   LUNGS: No rales, rhonchi or wheeze. Normal breath sounds bilaterally.  ABDOMEN: Soft, nontender without mass or organomegaly. bowel sounds normoactive.  EXTREMITIES: No clubbing, cyanosis or edema. Distal pulses wnl.   SKIN: warm and dry with normal turgor.  NEURO:   normal motor exam. No pathologic reflexes.    PSYCH: normal affect.        LABS:    12-14    139  |  102  |  26.0<H>  ----------------------------<  113  3.9   |  27.0  |  0.92    Ca    8.1<L>      14 Dec 2017 06:06      ALFREDO PALMA            RADIOLOGY & ADDITIONAL STUDIES:    INTERPRETATION OF TELEMETRY (personally reviewed): NSR, brief SVT     ECHO:< from: TTE Echo Complete w/Doppler (12.11.17 @ 09:37) >   Summary:   1. Technically adequate study.   2. Decreased left ventricular internal cavity size.   3. Normal global left ventricular systolic function.   4. Left ventricular ejection fraction, by visual estimation, is 70 to   75%.   5. Spectral Doppler shows pseudonormal pattern of left ventricular   myocardial filling (Grade II diastolic dysfunction).   6. Normal right ventricular size and function.   7. Mildly enlarged left atrium.   8. Elevated mean left atrial pressure.   9. Mildly increased LV wall thickness.  10. Sclerotic aortic valve with normal opening.  11. Moderately decreased posterior mitral leaflet mobility.  12. Mild mitral valve regurgitation.  13. Intra-atrial septal aneurysm.  14. Severe mitral annular calcification.  15. There is no evidence of pericardial effusion.     MD Isaías Electronically signed on 12/11/2017 at 3:27:27 PM    < end of copied text >           ASSESSMENT AND PLAN:  In summary, ALFREDO PALMA is an 89y Female with prior history of HTN, HLD, carotid disease, spinal stenosis, h/o CAD s/p CABG, h/o SVT who presented after fall and noted to have R humerus and R hip fracture. s/p ORIF. Brief episode of SVT/PAF on telemetry.    1) change cardizem to long acting 120mg daily. Previously determined to be not an AC candidate, Primary team to address DVT.  2) No further CVS workup at this time

## 2017-12-15 NOTE — DISCHARGE NOTE ADULT - CARE PLAN
Instructions for follow-up, activity and diet:	The patient will be seen in the office between 2-3 weeks for wound check. Sutures/Staples/Tape will be removed at that time. Patient may shower after post-op day #5. The dressing is to be removed on 12/21/17. The patient will contact the office if the wound becomes red, has increasing pain, develops bleeding or discharge, an injury occurs, or has other concerns. The patient will continue PT for gait training.  The patient will continue LOVENOX for 4 weeks for DVTP. The patient will take Oxycodone and Tylenol for pain control and titrate according to prescription and patient needs. The patient is FULL weight bearing of the right lower extremity.  Non- weight bearing of the right upper extremity. Principal Discharge DX:	Gait abnormality  Goal:	rehab  Instructions for follow-up, activity and diet:	The patient will be seen in the office between 2-3 weeks for wound check. Sutures/Staples/Tape will be removed at that time. Patient may shower after post-op day #5. The dressing is to be removed on 12/21/17. The patient will contact the office if the wound becomes red, has increasing pain, develops bleeding or discharge, an injury occurs, or has other concerns. The patient will continue PT for gait training.  The patient will continue LOVENOX for 4 weeks for DVTP. The patient will take Oxycodone and Tylenol for pain control and titrate according to prescription and patient needs. The patient is FULL weight bearing of the right lower extremity.  Non- weight bearing of the right upper extremity.  Secondary Diagnosis:	Essential hypertension  Secondary Diagnosis:	Pain management  Secondary Diagnosis:	CAD (coronary artery disease)  Secondary Diagnosis:	Paroxysmal atrial fibrillation  Secondary Diagnosis:	S/P CABG (coronary artery bypass graft)

## 2017-12-15 NOTE — DISCHARGE NOTE ADULT - PATIENT PORTAL LINK FT
“You can access the Follow[facility] Patient Portal, offered by [facility], by registering with the following website: [email or web address]”

## 2017-12-15 NOTE — PROGRESS NOTE ADULT - SUBJECTIVE AND OBJECTIVE BOX
[facility] is a 89y Female with HPI:  Patient presents as trauma B. Patient reportedly fell at home from standing while walking to bathroom and was found by family member on ground. No LOC.   Airway intact and stable   PT WITH FRACTURE OF LEFT HIP WENT TO OR      PT WITH DIFFICULT TO DETECT DISTAL EXT PULSES   PAIN RIGHT FOOT  PAIN MANAGEMENT CALLED    Allergies:  No Known Allergies      Medications:  acetaminophen   Tablet. 650 milliGRAM(s) Oral every 6 hours PRN  docusate sodium 100 milliGRAM(s) Oral three times a day  docusate sodium 100 milliGRAM(s) Oral two times a day  enoxaparin Injectable 40 milliGRAM(s) SubCutaneous daily  HYDROmorphone  Injectable 0.5 milliGRAM(s) IV Push every 6 hours PRN  lactated ringers. 1000 milliLiter(s) IV Continuous <Continuous>  morphine  - Injectable 2 milliGRAM(s) IV Push every 4 hours PRN  ondansetron Injectable 4 milliGRAM(s) IV Push every 6 hours PRN  oxyCODONE    IR 10 milliGRAM(s) Oral every 4 hours PRN  oxyCODONE    IR 5 milliGRAM(s) Oral every 4 hours PRN      ANTIBIOTICS:         Review of Systems: - Negative except as mentioned above     Physical Exam:     Vital Signs Last 24 Hrs  T(C): 37.1 (15 Dec 2017 05:20), Max: 37.2 (14 Dec 2017 15:58)  T(F): 98.7 (15 Dec 2017 05:20), Max: 98.9 (14 Dec 2017 15:58)  HR: 98 (15 Dec 2017 05:20) (98 - 102)  BP: 142/80 (15 Dec 2017 05:20) (126/80 - 142/80)  BP(mean): --  RR: 18 (15 Dec 2017 05:20) (18 - 18)  SpO2: 97% (15 Dec 2017 05:20) (97% - 97%)    GEN: NAD, pleasant  HEENT: normocephalic and atraumatic. EOMI. JOSE...  NECK: Supple. No carotid bruits.  No lymphadenopathy or thyromegaly.  LUNGS: Clear to auscultation.  HEART: Regular rate and rhythm without murmur.  ABDOMEN: Soft, nontender, and nondistended.  Positive bowel sounds.  No hepatosplenomegaly was noted.  NO REBOUND NO GUARDING  EXTREMITIES POST OP DRESSINGS EDEMA RIGHT LEG   RIGTH ARM IN SLG  NEUROLOGIC:  AWAKE MID DEMENTIA    SKIN: No ulceration or induration present.      Labs:

## 2017-12-15 NOTE — CONSULT NOTE ADULT - PROBLEM SELECTOR PROBLEM 4
Closed fracture of proximal end of right humerus, unspecified fracture morphology, initial encounter

## 2017-12-15 NOTE — CONSULT NOTE ADULT - ASSESSMENT
ALFREDO PALMA is a 89y Female who presents with pain in the right hip, RUE which is nociceptive, neuropathic in nature due to right hip IMN, right prox humerus fx.  Pain is intermittently controlled with current regimen.     Co-morbid Conditions:  Syncope and collapse  No h/o HF  Unknown h/o HF  Handoff  MEWS Score  CAD (coronary artery disease)  HTN (hypertension)  Other closed nondisplaced fracture of proximal end of right humerus, initial encounter  Subtrochanteric fracture of right femur, closed, initial encounter  Other closed nondisplaced fracture of proximal end of right humerus, initial encounter  Subtrochanteric fracture of right femur, closed, initial encounter  Syncope and collapse  HTN (hypertension)  CAD (coronary artery disease)  Prophylactic measure  Essential hypertension  Coronary artery disease involving coronary bypass graft of native heart without angina pectoris  Closed fracture of right hip, initial encounter  Subtrochanteric intramedullary nailing of right femur  S/P CABG (coronary artery bypass graft)  FALL  Pain management  Hip fracture

## 2017-12-15 NOTE — DISCHARGE NOTE ADULT - SECONDARY DIAGNOSIS.
Essential hypertension Pain management CAD (coronary artery disease) Paroxysmal atrial fibrillation S/P CABG (coronary artery bypass graft)

## 2017-12-15 NOTE — CONSULT NOTE ADULT - SUBJECTIVE AND OBJECTIVE BOX
Chief Complaint: right hip, RUE pain       HPI:  ALFREDO PALMA is a 89y Female that was seen and examined at bedside. Patient eating breakfast with assistance from nursing staff. She reports 8/10 pain localized to the right hip and RUE that is aching and burning in nature. Pain is worse with palpation and improves with pain medication. Pain is intermittently controlled with current regimen. Noted to be taking oxycodone 5mg along with morphine IV for breakthrough. Encouraged to ask for high dose of oxycodone if pain remains uncontrolled. She admits to flatus with recent BM. She denies any nausea, vomiting recently. She offers no further complaints.       REVIEW OF SYSTEMS: X denotes positive finding, otherwise all additional items were reviewed and negative  GEN: [] fever, [] chills, [] change in appetite, [] weight loss, [] fatigue, [] sedation  ENT: [] change in vision, [] dry mouth, [] ringing in ears, [] sore throat  RESP: [] shortness of breath, [] Obstructive sleep apnea  CV: [] chest pain, [] palpitations   GI: [] nausea, [] vomiting, [x] flatus, [x] recent BM, [] constipation, [] GERD  : [] bladder dysfunction, [] dysuria  MSK: [] weakness, [] joint pain, [] myalgias  Neuro: [x] pain, [] numbness, [] tingling, [] tremor, [] seizures  Skin: [] rash, [] pruritis  Psych: [] anxiety, [] depression  Endo: [] polydipsia  Heme: [] coagulopathic disorder      Home Medications:  Aspir 81 oral delayed release tablet: 1 tab(s) orally once a day (11 Dec 2017 12:37)  carbidopa-levodopa 25 mg-100 mg oral tablet, extended release: orally every 8 hours (11 Dec 2017 12:56)  Cozaar 100 mg oral tablet: 1 tab(s) orally once a day (11 Dec 2017 12:37)  donepezil 10 mg oral tablet: 1 tab(s) orally once a day (at bedtime) (11 Dec 2017 12:37)  Plavix 75 mg oral tablet: 1 tab(s) orally once a day (11 Dec 2017 12:37)  simvastatin 40 mg oral tablet: 1 tab(s) orally once a day (at bedtime) (11 Dec 2017 12:37)      PAST MEDICAL & SURGICAL HISTORY:  CAD (coronary artery disease)  HTN (hypertension)  S/P CABG (coronary artery bypass graft)      SOCIAL HISTORY:  Denies Smoking, Alcohol, or Drug Use  FAMILY HISTORY:  patient reports that she is unable to recall any family history of medical disorders       Allergies  No Known Allergies    Intolerances      PAIN MEDICATIONS:  MEDICATIONS  (STANDING):  diltiazem    Tablet 30 milliGRAM(s) Oral three times a day  docusate sodium 100 milliGRAM(s) Oral three times a day  docusate sodium 100 milliGRAM(s) Oral two times a day  enoxaparin Injectable 40 milliGRAM(s) SubCutaneous daily    MEDICATIONS  (PRN):  acetaminophen   Tablet. 650 milliGRAM(s) Oral every 6 hours PRN Moderate Pain (4 - 6)  HYDROmorphone  Injectable 0.5 milliGRAM(s) IV Push every 6 hours PRN breakthrough  morphine  - Injectable 2 milliGRAM(s) IV Push every 4 hours PRN Severe Pain (7 - 10)  ondansetron Injectable 4 milliGRAM(s) IV Push every 6 hours PRN Nausea and/or Vomiting  oxyCODONE    IR 10 milliGRAM(s) Oral every 4 hours PRN Moderate Pain  oxyCODONE    IR 5 milliGRAM(s) Oral every 4 hours PRN Mild Pain      Vital Signs Last 24 Hrs  T(C): 37.1 (15 Dec 2017 05:20), Max: 37.2 (14 Dec 2017 15:58)  T(F): 98.7 (15 Dec 2017 05:20), Max: 98.9 (14 Dec 2017 15:58)  HR: 98 (15 Dec 2017 05:20) (98 - 102)  BP: 142/80 (15 Dec 2017 05:20) (126/80 - 142/80)  BP(mean): --  RR: 18 (15 Dec 2017 05:20) (18 - 18)  SpO2: 97% (15 Dec 2017 05:20) (97% - 97%)             PHYSICAL EXAM: X denotes positive finding   GENERAL: [x] cooperative, [] uncooperative, [x] NAD, [] in distress, [] speech clear and coherent  HEENT: [x] NCAT, [x] EOMI, [] pupils non pinpoint, [] no external deformities, [] NGT   NECK: [x] normal overall appearance, [] no gross masses  RESPIRATORY: [x] unlabored respirations, [x] on room air, [] on supplemental O2, [] labored respirations  CV: [x] regular rate, [] regular rhythm, [] no LE edema, [] LE edema  Abdomen: [x] soft, [] non-tender, [] tender to palpation, [] pos bowel sounds  : [] gabriel, [x] deferred  Skin: [] normal texture, [] rash, [x] lesion(s), [] drain(s)   MSK: [x] limited AROM, [] extremities antigravity x4, [] normal gait  Neuro: [x] SILT, [] sensation reduced to light touch, [] abnormal DTRs  Psych: [] oriented to person, place, time, [] displays insight, [x] limited/impaired insight, [] poor coping skills       LABS:    12-14    139  |  102  |  26.0<H>  ----------------------------<  113  3.9   |  27.0  |  0.92    Ca    8.1<L>      14 Dec 2017 06:06      Drug Screen:      Radiology:      :  This report was requested by: Lizzette Galvez | Reference #: [id number]    There are no results for the search terms that you entered.

## 2017-12-15 NOTE — PROGRESS NOTE ADULT - ASSESSMENT
Patient presents as trauma B. Patient reportedly fell at home from standing while walking to bathroom and was found by family member on ground. No LOC.   Airway intact and stable   PT WITH FRACTURE OF LEFT HIP WENT TO OR   HAD DROP IN HB ACUTE BLOOD LOSS ANEMIA NOW STABLE  HAD BEEN ON PLAVIX AT HOME  PT WITH DIFFICULT TO DETECT DISTAL EXT PULSES   PT WITH CHRONIC STASIS DERMATITIS  OF BOTH LOWER  EXTREMITIES  AND PAINFUL RIGHT FOOT  HAD SVT NO  ON CARDZIEM  OVERALL STABLE   PT WITH CONTINUED PAIN MANAGEMENT CALLED TO SEE PATIENT  WILL FOLLOW UP

## 2017-12-15 NOTE — DISCHARGE NOTE ADULT - CARE PROVIDER_API CALL
Dakota Snowden), Orthopaedic Surgery  [address] [place] [address]  Phone: [phone]  Fax: [phone]    Mario Nugent), Infectious Disease; Internal Medicine  [address] [place] [address]  Phone: [phone]  Fax: [phone]

## 2017-12-15 NOTE — DISCHARGE NOTE ADULT - HOSPITAL COURSE
PT ADMITTED WITH FALL RIGHT HIP FRACTURE AND RIGH SHOULDER FRACTURE  S/P OR WITH DR MOTT FOR RIGHT HIP PT WITH DEMENTIA  PT WITH POST OP PAIN SEEN BY PAIN MANAGEMENT  ALSO WITH URINARY RETNETION NEEDED STRAIGHT CATH NOW INCONTINENT  UEIN CX WITH GM NEG RODS UNCLEAR IF TURE INFECTION WILL RECOMMEND KEFLEX FOR 5 DAYS RECEIVED 2 DAYS OF ROCEPHIN  CBC WITH EUKOCYTOSIS 17  PT AFEBRILE NON TOXIC MAY BE LEUKEMOID RXN    will check  cxr and repeat in am PT ADMITTED WITH FALL RIGHT HIP FRACTURE AND RIGH SHOULDER FRACTURE  S/P OR WITH DR MOTT FOR RIGHT HIP PT WITH DEMENTIA  PT WITH POST OP PAIN SEEN BY PAIN MANAGEMENT  ALSO WITH URINARY RETNETION NEEDED STRAIGHT CATH NOW INCONTINENT  SEEN BY UROLOGY AND WAS STARTED ON FLOMAX AND BETHANECOL WITH INTERMITTENT STRAIGHT CATH   MARKHAM PLAED EVENTUAL TRILA OF VOID AT REHAB  URINE CX TREATED WITH ABX 6 DAYS WOULD TREAT ONE MORE DAY THROUGH 12/28  PT AFEBRILE NON TOXIC     FOR D/C TO REHAB

## 2017-12-15 NOTE — CONSULT NOTE ADULT - ATTENDING COMMENTS
PLAN-  #Controlled Substances:  - oxycodone 5/10mg PO q4h prn mod/sev pain  - Dilaudid 0.5mg IV q6h prn breakthrough pain only   - d/c morphine 2mg IV q4h prn severe pain   - Please monitor closely for oversedation and respiratory depression     #Adjuvant Analgesics:  - start acetaminophen 975mg PO q8h  - start gabapentin 100mg PO TID     #Bowel Regimen:  - per primary team as clinically indicated     Risks, benefits, and alternatives to opioids discussed with patient, who verbalizes understanding.   Patient counseled on treatment plan and pain expectations.   All questions and concerns addressed at this time.  Please page if any concerns: [phone].

## 2017-12-16 DIAGNOSIS — I48.0 PAROXYSMAL ATRIAL FIBRILLATION: ICD-10-CM

## 2017-12-16 PROCEDURE — 99232 SBSQ HOSP IP/OBS MODERATE 35: CPT

## 2017-12-16 RX ADMIN — OXYCODONE HYDROCHLORIDE 10 MILLIGRAM(S): 5 TABLET ORAL at 10:45

## 2017-12-16 RX ADMIN — Medication 100 MILLIGRAM(S): at 22:29

## 2017-12-16 RX ADMIN — OXYCODONE HYDROCHLORIDE 10 MILLIGRAM(S): 5 TABLET ORAL at 23:00

## 2017-12-16 RX ADMIN — HYDROMORPHONE HYDROCHLORIDE 0.5 MILLIGRAM(S): 2 INJECTION INTRAMUSCULAR; INTRAVENOUS; SUBCUTANEOUS at 20:09

## 2017-12-16 RX ADMIN — ENOXAPARIN SODIUM 40 MILLIGRAM(S): 100 INJECTION SUBCUTANEOUS at 11:41

## 2017-12-16 RX ADMIN — HYDROMORPHONE HYDROCHLORIDE 0.5 MILLIGRAM(S): 2 INJECTION INTRAMUSCULAR; INTRAVENOUS; SUBCUTANEOUS at 03:30

## 2017-12-16 RX ADMIN — OXYCODONE HYDROCHLORIDE 10 MILLIGRAM(S): 5 TABLET ORAL at 17:31

## 2017-12-16 RX ADMIN — GABAPENTIN 100 MILLIGRAM(S): 400 CAPSULE ORAL at 12:24

## 2017-12-16 RX ADMIN — Medication 975 MILLIGRAM(S): at 13:43

## 2017-12-16 RX ADMIN — HYDROMORPHONE HYDROCHLORIDE 0.5 MILLIGRAM(S): 2 INJECTION INTRAMUSCULAR; INTRAVENOUS; SUBCUTANEOUS at 03:00

## 2017-12-16 RX ADMIN — GABAPENTIN 100 MILLIGRAM(S): 400 CAPSULE ORAL at 06:08

## 2017-12-16 RX ADMIN — Medication 975 MILLIGRAM(S): at 06:38

## 2017-12-16 RX ADMIN — Medication 100 MILLIGRAM(S): at 06:08

## 2017-12-16 RX ADMIN — OXYCODONE HYDROCHLORIDE 10 MILLIGRAM(S): 5 TABLET ORAL at 13:43

## 2017-12-16 RX ADMIN — GABAPENTIN 100 MILLIGRAM(S): 400 CAPSULE ORAL at 22:29

## 2017-12-16 RX ADMIN — Medication 100 MILLIGRAM(S): at 11:41

## 2017-12-16 RX ADMIN — Medication 975 MILLIGRAM(S): at 22:29

## 2017-12-16 RX ADMIN — HYDROMORPHONE HYDROCHLORIDE 0.5 MILLIGRAM(S): 2 INJECTION INTRAMUSCULAR; INTRAVENOUS; SUBCUTANEOUS at 20:25

## 2017-12-16 RX ADMIN — OXYCODONE HYDROCHLORIDE 10 MILLIGRAM(S): 5 TABLET ORAL at 18:01

## 2017-12-16 RX ADMIN — Medication 975 MILLIGRAM(S): at 12:24

## 2017-12-16 RX ADMIN — Medication 975 MILLIGRAM(S): at 23:00

## 2017-12-16 RX ADMIN — OXYCODONE HYDROCHLORIDE 10 MILLIGRAM(S): 5 TABLET ORAL at 22:31

## 2017-12-16 RX ADMIN — Medication 975 MILLIGRAM(S): at 06:08

## 2017-12-16 NOTE — PROGRESS NOTE ADULT - ASSESSMENT
a 89y Female s/p RIGHT hip ORIF. She is reporting pain better controlled with medication regimen/changes.

## 2017-12-16 NOTE — PROGRESS NOTE ADULT - SUBJECTIVE AND OBJECTIVE BOX
Chief Complaint: RUE, RIGHT HIP pain    Patient seen and examined at bedside. patient resting comfortably in bed. Her daughter is at her bedside to help with her care. Patient is reporting pain well controlled with pain regimen. Pain tolerable with medications 5/10 on VNRS 10/10 without medications or when she "forgets to ask for it". She is reporting pain localized to the RUE and right hip. Pain worsens with ambulation. Denies side effects from medications    PAST MEDICAL & SURGICAL HISTORY:  CAD (coronary artery disease)  HTN (hypertension)  S/P CABG (coronary artery bypass graft)      SOCIAL HISTORY:  [ ] Denies Smoking, Alcohol, or Drug Use    Allergies    No Known Allergies    Intolerances        PAIN MEDICATIONS:  acetaminophen   Tablet. 975 milliGRAM(s) Oral every 8 hours  gabapentin 100 milliGRAM(s) Oral three times a day  HYDROmorphone  Injectable 0.5 milliGRAM(s) IV Push every 6 hours PRN  ondansetron Injectable 4 milliGRAM(s) IV Push every 6 hours PRN  oxyCODONE    IR 10 milliGRAM(s) Oral every 4 hours PRN  oxyCODONE    IR 5 milliGRAM(s) Oral every 4 hours PRN    Heme:  enoxaparin Injectable 40 milliGRAM(s) SubCutaneous daily    Antibiotics:    Cardiac:  diltiazem    milliGRAM(s) Oral daily    Pulmonary:    Endocrine    GI:  docusate sodium 100 milliGRAM(s) Oral three times a day  docusate sodium 100 milliGRAM(s) Oral two times a day    All Other Meds:      REVIEW OF SYSTEMS:  CONSTITUTIONAL: Negative fever,chills  NECK: No pain or stiffness  RESPIRATORY: No cough, wheezing,  No shortness of breath  GASTROINTESTINAL: No abdominal, No nausea, vomiting; No diarrhea or constipation.   GENITOURINARY: No dysuria, frequency, or incontinence  NEUROLOGICAL: No headaches, memory loss, loss of strength, numbness, or  SKIN: No itching, burning, rashes, or lesions   MUSCULOSKELETAL: + joint pain or swelling; No muscle, back, + extremity pain    PHYSICAL EXAM:    Vital Signs Last 24 Hrs  T(C): 36.7 (16 Dec 2017 06:04), Max: 36.9 (15 Dec 2017 16:38)  T(F): 98 (16 Dec 2017 06:04), Max: 98.4 (15 Dec 2017 16:38)  HR: 113 (16 Dec 2017 06:04) (94 - 113)  BP: 138/70 (16 Dec 2017 06:04) (120/50 - 138/70)  BP(mean): --  RR: 18 (16 Dec 2017 06:04) (18 - 18)  SpO2: 96% (16 Dec 2017 06:04) (95% - 96%)    PAIN SCORE:  5      SCALE USED: (1-10 VNRS)       GENERAL: Comfortable, in no apparent distress  HEENT:  Atraumatic, Normocephalic, PERRL, EOMI  NECK: Supple  LUNG: Respiration even and unlabored, no distress noted  ABDOMEN: Soft, Nontender, Nondistended;   BACK: No midline bony tenderness to palpation, no step off or deformity noted.   EXTREMITIES:  DICKSON X 4; 2+ Peripheral Pulses, No clubbing, cyanosis, or edema, right hip incision clean dry and intact, RUE in sling  NEUROLOGIC: Awake, alert and oriented X3;  No focal deficits. Motor strength 5/5. Sensation intact to light touch  SKIN: Warm and Dry    LABS:                  Drug Screen:        RADIOLOGY:      [ ]  [place]  Reviewed and Copied to Chart

## 2017-12-16 NOTE — PROGRESS NOTE ADULT - SUBJECTIVE AND OBJECTIVE BOX
[facility]  INFECTIOUS DISEASES AND INTERNAL MEDICINE at [place]  =======================================================  Wong Erwin MD  Diplomates American Board of Internal Medicine and Infectious Diseases  =======================================================    ALFREDO PALMA [id number]    Follow up: Fall    c/o leg pain    Allergies:  No Known Allergies      Medications:  acetaminophen   Tablet. 650 milliGRAM(s) Oral every 6 hours PRN  acetaminophen   Tablet. 975 milliGRAM(s) Oral every 8 hours  diltiazem    milliGRAM(s) Oral daily  docusate sodium 100 milliGRAM(s) Oral three times a day  docusate sodium 100 milliGRAM(s) Oral two times a day  enoxaparin Injectable 40 milliGRAM(s) SubCutaneous daily  gabapentin 100 milliGRAM(s) Oral three times a day  HYDROmorphone  Injectable 0.5 milliGRAM(s) IV Push every 6 hours PRN  ondansetron Injectable 4 milliGRAM(s) IV Push every 6 hours PRN  oxyCODONE    IR 10 milliGRAM(s) Oral every 4 hours PRN  oxyCODONE    IR 5 milliGRAM(s) Oral every 4 hours PRN      REVIEW OF SYSTEMS:  CONSTITUTIONAL:  No Fever or chills  HEENT:   No diplopia or blurred vision.  No earache, sore throat or runny nose.  CARDIOVASCULAR:  No pressure, squeezing, strangling, tightness, heaviness or aching about the chest, neck, axilla or epigastrium.  RESPIRATORY:  No cough, shortness of breath  GASTROINTESTINAL:  No nausea, vomiting or diarrhea.  GENITOURINARY:  No dysuria, frequency or urgency. No Blood in urine  MUSCULOSKELETAL:  Rt leg pain  SKIN:  No change in skin, hair or nails.  ENDOCRINE:  No heat or cold intolerance  HEMATOLOGICAL:  No easy bruising or bleeding.       Physical Exam:  Vital Signs Last 24 Hrs  T(C): 36.7 (16 Dec 2017 06:04), Max: 36.9 (15 Dec 2017 16:38)  T(F): 98 (16 Dec 2017 06:04), Max: 98.4 (15 Dec 2017 16:38)  HR: 113 (16 Dec 2017 06:04) (83 - 113)  BP: 138/70 (16 Dec 2017 06:04) (120/50 - 153/76)  RR: 18 (16 Dec 2017 06:04) (18 - 18)  SpO2: 96% (16 Dec 2017 06:04) (93% - 96%)      GEN: NAD, pleasant  HEENT: normocephalic and atraumatic. EOMI. PERRL.    NECK: Supple. No carotid bruits.  LUNGS: Clear to auscultation.  HEART: Regular rate and rhythm  ABDOMEN: Soft, nontender, and nondistended.  Positive bowel sounds.    : No CVA tenderness  EXTREMITIES: + edema  MSK: no joint swelling  NEUROLOGIC: Cranial nerves II through XII are grossly intact.  PSYCHIATRIC: Appropriate affect .  SKIN: No rash

## 2017-12-16 NOTE — PROGRESS NOTE ADULT - PROBLEM SELECTOR PLAN 1
1. patient is reporting pain well controlled with regimen.  2. patient and her daughter were encouraged to ask for medications when she needs it. She admits that at times she does not always ask for medications and then her pain escalates. She was advised to ask for it when she is in pain to have better consistent control over her pain  3. Meds      - continue oxycodone 5/10 q 4 prn mild/moderate      - continue dilaudid 0.5 mg IV q 6 BTP only      - continue tylenol 975 mg q 8 ATC      - continue gabapentin 100 mg tid, consider dose increase as appropriate to 200 mg tid about 72 hours after initiation of initial dose if needed for better pain control.   - continue bowel regimen  4. will continue to follow  5. call with questions

## 2017-12-17 PROCEDURE — 99232 SBSQ HOSP IP/OBS MODERATE 35: CPT

## 2017-12-17 RX ADMIN — HYDROMORPHONE HYDROCHLORIDE 0.5 MILLIGRAM(S): 2 INJECTION INTRAMUSCULAR; INTRAVENOUS; SUBCUTANEOUS at 13:48

## 2017-12-17 RX ADMIN — Medication 975 MILLIGRAM(S): at 22:33

## 2017-12-17 RX ADMIN — OXYCODONE HYDROCHLORIDE 10 MILLIGRAM(S): 5 TABLET ORAL at 16:09

## 2017-12-17 RX ADMIN — Medication 975 MILLIGRAM(S): at 23:15

## 2017-12-17 RX ADMIN — OXYCODONE HYDROCHLORIDE 10 MILLIGRAM(S): 5 TABLET ORAL at 12:36

## 2017-12-17 RX ADMIN — OXYCODONE HYDROCHLORIDE 10 MILLIGRAM(S): 5 TABLET ORAL at 11:36

## 2017-12-17 RX ADMIN — Medication 100 MILLIGRAM(S): at 13:27

## 2017-12-17 RX ADMIN — Medication 975 MILLIGRAM(S): at 13:49

## 2017-12-17 RX ADMIN — Medication 975 MILLIGRAM(S): at 05:21

## 2017-12-17 RX ADMIN — GABAPENTIN 100 MILLIGRAM(S): 400 CAPSULE ORAL at 05:21

## 2017-12-17 RX ADMIN — OXYCODONE HYDROCHLORIDE 10 MILLIGRAM(S): 5 TABLET ORAL at 05:22

## 2017-12-17 RX ADMIN — Medication 100 MILLIGRAM(S): at 22:33

## 2017-12-17 RX ADMIN — GABAPENTIN 100 MILLIGRAM(S): 400 CAPSULE ORAL at 13:19

## 2017-12-17 RX ADMIN — Medication 975 MILLIGRAM(S): at 13:19

## 2017-12-17 RX ADMIN — GABAPENTIN 100 MILLIGRAM(S): 400 CAPSULE ORAL at 22:33

## 2017-12-17 RX ADMIN — OXYCODONE HYDROCHLORIDE 10 MILLIGRAM(S): 5 TABLET ORAL at 23:15

## 2017-12-17 RX ADMIN — HYDROMORPHONE HYDROCHLORIDE 0.5 MILLIGRAM(S): 2 INJECTION INTRAMUSCULAR; INTRAVENOUS; SUBCUTANEOUS at 13:18

## 2017-12-17 RX ADMIN — ENOXAPARIN SODIUM 40 MILLIGRAM(S): 100 INJECTION SUBCUTANEOUS at 22:33

## 2017-12-17 RX ADMIN — OXYCODONE HYDROCHLORIDE 10 MILLIGRAM(S): 5 TABLET ORAL at 16:39

## 2017-12-17 RX ADMIN — OXYCODONE HYDROCHLORIDE 10 MILLIGRAM(S): 5 TABLET ORAL at 06:19

## 2017-12-17 RX ADMIN — Medication 100 MILLIGRAM(S): at 05:21

## 2017-12-17 RX ADMIN — Medication 975 MILLIGRAM(S): at 05:51

## 2017-12-17 RX ADMIN — OXYCODONE HYDROCHLORIDE 10 MILLIGRAM(S): 5 TABLET ORAL at 22:35

## 2017-12-17 NOTE — PROGRESS NOTE ADULT - SUBJECTIVE AND OBJECTIVE BOX
[facility]  INFECTIOUS DISEASES AND INTERNAL MEDICINE at [place]  =======================================================  Wong Erwin MD  Diplomates American Board of Internal Medicine and Infectious Diseases  =======================================================    ALFREDO PALMA [id number]    Follow up: Fall    c/o leg pain    Allergies:  No Known Allergies      MEDICATIONS  (STANDING):  acetaminophen   Tablet. 975 milliGRAM(s) Oral every 8 hours  diltiazem    milliGRAM(s) Oral daily  docusate sodium 100 milliGRAM(s) Oral three times a day  docusate sodium 100 milliGRAM(s) Oral two times a day  enoxaparin Injectable 40 milliGRAM(s) SubCutaneous daily  gabapentin 100 milliGRAM(s) Oral three times a day      REVIEW OF SYSTEMS:  CONSTITUTIONAL:  No Fever or chills  HEENT:   No diplopia or blurred vision.  No earache, sore throat or runny nose.  CARDIOVASCULAR:  No pressure, squeezing, strangling, tightness, heaviness or aching about the chest, neck, axilla or epigastrium.  RESPIRATORY:  No cough, shortness of breath  GASTROINTESTINAL:  No nausea, vomiting or diarrhea.  GENITOURINARY:  No dysuria, frequency or urgency. No Blood in urine  MUSCULOSKELETAL:  Rt leg pain  SKIN:  No change in skin, hair or nails.  ENDOCRINE:  No heat or cold intolerance  HEMATOLOGICAL:  No easy bruising or bleeding.       Physical Exam:  Vital Signs Last 24 Hrs  T(C): 36.6 (17 Dec 2017 05:20), Max: 36.8 (16 Dec 2017 22:10)  T(F): 97.9 (17 Dec 2017 05:20), Max: 98.2 (16 Dec 2017 22:10)  HR: 110 (17 Dec 2017 05:20) (66 - 110)  BP: 168/72 (17 Dec 2017 05:20) (127/51 - 168/72)  RR: 18 (17 Dec 2017 05:20) (18 - 18)      GEN: NAD, pleasant  HEENT: normocephalic and atraumatic. EOMI. PERRL.    NECK: Supple. No carotid bruits.  LUNGS: Clear to auscultation.  HEART: Regular rate and rhythm  ABDOMEN: Soft, nontender, and nondistended.  Positive bowel sounds.    : No CVA tenderness  EXTREMITIES: + edema  MSK: no joint swelling  NEUROLOGIC: Cranial nerves II through XII are grossly intact.  PSYCHIATRIC: Appropriate affect .  SKIN: No rash

## 2017-12-18 LAB
ALBUMIN SERPL ELPH-MCNC: 3.2 G/DL — LOW (ref 3.3–5.2)
ALP SERPL-CCNC: 64 U/L — SIGNIFICANT CHANGE UP (ref 40–120)
ALT FLD-CCNC: 21 U/L — SIGNIFICANT CHANGE UP
ANION GAP SERPL CALC-SCNC: 12 MMOL/L — SIGNIFICANT CHANGE UP (ref 5–17)
AST SERPL-CCNC: 35 U/L — HIGH
BILIRUB SERPL-MCNC: 1 MG/DL — SIGNIFICANT CHANGE UP (ref 0.4–2)
BUN SERPL-MCNC: 34 MG/DL — HIGH (ref 8–20)
CALCIUM SERPL-MCNC: 9 MG/DL — SIGNIFICANT CHANGE UP (ref 8.6–10.2)
CHLORIDE SERPL-SCNC: 102 MMOL/L — SIGNIFICANT CHANGE UP (ref 98–107)
CO2 SERPL-SCNC: 26 MMOL/L — SIGNIFICANT CHANGE UP (ref 22–29)
CREAT SERPL-MCNC: 0.74 MG/DL — SIGNIFICANT CHANGE UP (ref 0.5–1.3)
GLUCOSE SERPL-MCNC: 109 MG/DL — SIGNIFICANT CHANGE UP (ref 70–115)
HCT VFR BLD CALC: 27.5 % — LOW (ref 37–47)
HGB BLD-MCNC: 8.9 G/DL — LOW (ref 12–16)
MCHC RBC-ENTMCNC: 29.8 PG — SIGNIFICANT CHANGE UP (ref 27–31)
MCHC RBC-ENTMCNC: 32.4 G/DL — SIGNIFICANT CHANGE UP (ref 32–36)
MCV RBC AUTO: 92 FL — SIGNIFICANT CHANGE UP (ref 81–99)
PLATELET # BLD AUTO: 309 K/UL — SIGNIFICANT CHANGE UP (ref 150–400)
POTASSIUM SERPL-MCNC: 4.1 MMOL/L — SIGNIFICANT CHANGE UP (ref 3.5–5.3)
POTASSIUM SERPL-SCNC: 4.1 MMOL/L — SIGNIFICANT CHANGE UP (ref 3.5–5.3)
PROT SERPL-MCNC: 6.5 G/DL — LOW (ref 6.6–8.7)
RBC # BLD: 2.99 M/UL — LOW (ref 4.4–5.2)
RBC # FLD: 16.4 % — HIGH (ref 11–15.6)
SODIUM SERPL-SCNC: 140 MMOL/L — SIGNIFICANT CHANGE UP (ref 135–145)
WBC # BLD: 11.1 K/UL — HIGH (ref 4.8–10.8)
WBC # FLD AUTO: 11.1 K/UL — HIGH (ref 4.8–10.8)

## 2017-12-18 PROCEDURE — 99232 SBSQ HOSP IP/OBS MODERATE 35: CPT

## 2017-12-18 RX ADMIN — Medication 100 MILLIGRAM(S): at 13:05

## 2017-12-18 RX ADMIN — ENOXAPARIN SODIUM 40 MILLIGRAM(S): 100 INJECTION SUBCUTANEOUS at 13:08

## 2017-12-18 RX ADMIN — OXYCODONE HYDROCHLORIDE 10 MILLIGRAM(S): 5 TABLET ORAL at 05:52

## 2017-12-18 RX ADMIN — GABAPENTIN 100 MILLIGRAM(S): 400 CAPSULE ORAL at 21:00

## 2017-12-18 RX ADMIN — Medication 100 MILLIGRAM(S): at 05:22

## 2017-12-18 RX ADMIN — Medication 10 MILLIGRAM(S): at 09:57

## 2017-12-18 RX ADMIN — Medication 975 MILLIGRAM(S): at 08:29

## 2017-12-18 RX ADMIN — OXYCODONE HYDROCHLORIDE 10 MILLIGRAM(S): 5 TABLET ORAL at 11:15

## 2017-12-18 RX ADMIN — OXYCODONE HYDROCHLORIDE 10 MILLIGRAM(S): 5 TABLET ORAL at 10:37

## 2017-12-18 RX ADMIN — HYDROMORPHONE HYDROCHLORIDE 0.5 MILLIGRAM(S): 2 INJECTION INTRAMUSCULAR; INTRAVENOUS; SUBCUTANEOUS at 21:25

## 2017-12-18 RX ADMIN — HYDROMORPHONE HYDROCHLORIDE 0.5 MILLIGRAM(S): 2 INJECTION INTRAMUSCULAR; INTRAVENOUS; SUBCUTANEOUS at 20:55

## 2017-12-18 RX ADMIN — Medication 100 MILLIGRAM(S): at 17:56

## 2017-12-18 RX ADMIN — Medication 975 MILLIGRAM(S): at 13:04

## 2017-12-18 RX ADMIN — GABAPENTIN 100 MILLIGRAM(S): 400 CAPSULE ORAL at 13:05

## 2017-12-18 RX ADMIN — OXYCODONE HYDROCHLORIDE 5 MILLIGRAM(S): 5 TABLET ORAL at 18:07

## 2017-12-18 RX ADMIN — Medication 100 MILLIGRAM(S): at 21:00

## 2017-12-18 RX ADMIN — OXYCODONE HYDROCHLORIDE 10 MILLIGRAM(S): 5 TABLET ORAL at 05:22

## 2017-12-18 RX ADMIN — OXYCODONE HYDROCHLORIDE 5 MILLIGRAM(S): 5 TABLET ORAL at 18:30

## 2017-12-18 RX ADMIN — Medication 975 MILLIGRAM(S): at 21:30

## 2017-12-18 RX ADMIN — GABAPENTIN 100 MILLIGRAM(S): 400 CAPSULE ORAL at 05:23

## 2017-12-18 RX ADMIN — Medication 975 MILLIGRAM(S): at 13:14

## 2017-12-18 RX ADMIN — Medication 975 MILLIGRAM(S): at 05:21

## 2017-12-18 RX ADMIN — Medication 975 MILLIGRAM(S): at 21:00

## 2017-12-18 NOTE — PROGRESS NOTE ADULT - SUBJECTIVE AND OBJECTIVE BOX
[facility] is a 89y Female with HPI:  Patient presents as trauma B. Patient reportedly fell at home from standing while walking to bathroom and was found by family member on ground. No LOC.   Airway intact and stable   PT WITH FRACTURE OF LEFT HIP    S/P OR    Allergies:  No Known Allergies      Medications:  acetaminophen   Tablet. 650 milliGRAM(s) Oral every 6 hours PRN  docusate sodium 100 milliGRAM(s) Oral three times a day  docusate sodium 100 milliGRAM(s) Oral two times a day  enoxaparin Injectable 40 milliGRAM(s) SubCutaneous daily  HYDROmorphone  Injectable 0.5 milliGRAM(s) IV Push every 6 hours PRN  lactated ringers. 1000 milliLiter(s) IV Continuous <Continuous>  morphine  - Injectable 2 milliGRAM(s) IV Push every 4 hours PRN  ondansetron Injectable 4 milliGRAM(s) IV Push every 6 hours PRN  oxyCODONE    IR 10 milliGRAM(s) Oral every 4 hours PRN  oxyCODONE    IR 5 milliGRAM(s) Oral every 4 hours PRN      ANTIBIOTICS:         Review of Systems: - Negative except as mentioned above     Physical Exam:     Vital Signs Last 24 Hrs  T(C): 36.4 (18 Dec 2017 07:53), Max: 37 (17 Dec 2017 16:00)  T(F): 97.5 (18 Dec 2017 07:53), Max: 98.6 (17 Dec 2017 16:00)  HR: 85 (18 Dec 2017 07:53) (79 - 98)  BP: 155/64 (18 Dec 2017 07:53) (120/56 - 162/70)  BP(mean): --  RR: 18 (18 Dec 2017 07:53) (16 - 19)  SpO2: 98% (18 Dec 2017 07:53) (94% - 99%)      GEN: NAD, pleasant CONFUSED BUT KNOWS MY NAME  HEENT: normocephalic and atraumatic. EOMI. JOSE...  NECK: Supple. No carotid bruits.  No lymphadenopathy or thyromegaly.  LUNGS: Clear to auscultation.  HEART: Regular rate and rhythm without murmur.  ABDOMEN: Soft, nontender, and nondistended.  Positive bowel sounds.  No hepatosplenomegaly was noted.  NO REBOUND NO GUARDING  EXTREMITIES POST OP RIGHT HIP NO ERYTHEMA  MILD EDEMA     NEUROLOGIC:  AWAKE MID DEMENTIA    SKIN: No ulceration or induration present.      Labs:

## 2017-12-18 NOTE — PROGRESS NOTE ADULT - SUBJECTIVE AND OBJECTIVE BOX
Chief Complaint: right hip, RUE pain       HPI:  ALFREDO PALMA is a 89y Female that was seen and examined at bedside. Resting comfortably supine in bed this AM and reports 7/10 pain localized to the right hip. There are no reported adverse effects from medication regimen. Patient noted to be receiving oxycodone 10mg with relief of symptoms. No nausea, vomiting. Patient unsure of last BM. She offers no further complaints.       REVIEW OF SYSTEMS: X denotes positive finding, otherwise all additional items were reviewed and negative  GEN: [] fever, [] chills, [] change in appetite, [] weight loss, [] fatigue, [] sedation  ENT: [] change in vision, [] dry mouth, [] ringing in ears, [] sore throat  RESP: [] shortness of breath, [] Obstructive sleep apnea  CV: [] chest pain, [] palpitations   GI: [] nausea, [] vomiting, [x] flatus, [x] recent BM, [] constipation, [] GERD  : [] bladder dysfunction, [] dysuria  MSK: [] weakness, [] joint pain, [] myalgias  Neuro: [x] pain, [] numbness, [] tingling, [] tremor, [] seizures  Skin: [] rash, [] pruritis  Psych: [] anxiety, [] depression  Endo: [] polydipsia  Heme: [] coagulopathic disorder      Home Medications:  Aspir 81 oral delayed release tablet: 1 tab(s) orally once a day (11 Dec 2017 12:37)  carbidopa-levodopa 25 mg-100 mg oral tablet, extended release: orally every 8 hours (11 Dec 2017 12:56)  Cozaar 100 mg oral tablet: 1 tab(s) orally once a day (11 Dec 2017 12:37)  donepezil 10 mg oral tablet: 1 tab(s) orally once a day (at bedtime) (11 Dec 2017 12:37)  Plavix 75 mg oral tablet: 1 tab(s) orally once a day (11 Dec 2017 12:37)  simvastatin 40 mg oral tablet: 1 tab(s) orally once a day (at bedtime) (11 Dec 2017 12:37)      PAST MEDICAL & SURGICAL HISTORY:  CAD (coronary artery disease)  HTN (hypertension)  S/P CABG (coronary artery bypass graft)      SOCIAL HISTORY:  Denies Smoking, Alcohol, or Drug Use  FAMILY HISTORY:  patient reports that she is unable to recall any family history of medical disorders       Allergies  No Known Allergies    Intolerances      PAIN MEDICATIONS:  MEDICATIONS  (STANDING):  acetaminophen   Tablet. 975 milliGRAM(s) Oral every 8 hours  diltiazem    milliGRAM(s) Oral daily  docusate sodium 100 milliGRAM(s) Oral three times a day  docusate sodium 100 milliGRAM(s) Oral two times a day  enoxaparin Injectable 40 milliGRAM(s) SubCutaneous daily  gabapentin 100 milliGRAM(s) Oral three times a day    MEDICATIONS  (PRN):  HYDROmorphone  Injectable 0.5 milliGRAM(s) IV Push every 6 hours PRN breakthrough  ondansetron Injectable 4 milliGRAM(s) IV Push every 6 hours PRN Nausea and/or Vomiting  oxyCODONE    IR 10 milliGRAM(s) Oral every 4 hours PRN Moderate Pain  oxyCODONE    IR 5 milliGRAM(s) Oral every 4 hours PRN Mild Pain      Vital Signs Last 24 Hrs  T(C): 36.4 (18 Dec 2017 07:53), Max: 37 (17 Dec 2017 16:00)  T(F): 97.5 (18 Dec 2017 07:53), Max: 98.6 (17 Dec 2017 16:00)  HR: 85 (18 Dec 2017 07:53) (79 - 98)  BP: 155/64 (18 Dec 2017 07:53) (130/84 - 162/70)  BP(mean): --  RR: 18 (18 Dec 2017 07:53) (17 - 19)  SpO2: 98% (18 Dec 2017 07:53) (94% - 99%)             PHYSICAL EXAM: X denotes positive finding   GENERAL: [x] cooperative, [] uncooperative, [x] NAD, [] in distress, [] speech clear and coherent  HEENT: [x] NCAT, [x] EOMI, [] pupils non pinpoint, [] no external deformities, [] NGT   NECK: [x] normal overall appearance, [] no gross masses  RESPIRATORY: [x] unlabored respirations, [x] on room air, [] on supplemental O2, [] labored respirations  CV: [x] regular rate, [] regular rhythm, [] no LE edema, [] LE edema  Abdomen: [x] soft, [] non-tender, [] tender to palpation, [] pos bowel sounds  : [] gabriel, [x] deferred  Skin: [] normal texture, [] rash, [x] lesion(s), [] drain(s)   MSK: [x] limited AROM, [] extremities antigravity x4, [] normal gait  Neuro: [x] SILT, [] sensation reduced to light touch, [] abnormal DTRs  Psych: [] oriented to person, place, time, [] displays insight, [x] limited/impaired insight, [] poor coping skills       LABS:                        8.9    11.1  )-----------( 309      ( 18 Dec 2017 09:08 )             27.5   12-18    140  |  102  |  34.0<H>  ----------------------------<  109  4.1   |  26.0  |  0.74    Ca    9.0      18 Dec 2017 09:08    TPro  6.5<L>  /  Alb  3.2<L>  /  TBili  1.0  /  DBili  x   /  AST  35<H>  /  ALT  21  /  AlkPhos  64  12-18      Drug Screen:      Radiology:      :  This report was requested by: Lizzette Galvez | Reference #: [id number]    There are no results for the search terms that you entered.

## 2017-12-18 NOTE — PROGRESS NOTE ADULT - ATTENDING COMMENTS
PLAN-  #Controlled Substances:  - oxycodone 5/10mg PO q4h prn mod/sev pain  - Dilaudid 0.5mg IV q6h prn breakthrough pain only   - s/p morphine 2mg IV q4h prn severe pain   - Please monitor closely for oversedation and respiratory depression     #Adjuvant Analgesics:  - acetaminophen 975mg PO q8h  - gabapentin 100mg PO TID     #Bowel Regimen:  - per primary team as clinically indicated     Risks, benefits, and alternatives to opioids discussed with patient, who verbalizes understanding.   Patient counseled on treatment plan and pain expectations.   All questions and concerns addressed at this time.  Re-consult prn.

## 2017-12-18 NOTE — PROGRESS NOTE ADULT - PROBLEM SELECTOR PROBLEM 4
Closed fracture of proximal end of right humerus, unspecified fracture morphology, initial encounter
CAD (coronary artery disease)
CAD (coronary artery disease)
HTN (hypertension)
Prophylactic measure

## 2017-12-18 NOTE — PROGRESS NOTE ADULT - PROBLEM SELECTOR PROBLEM 3
Gait abnormality
CAD (coronary artery disease)
Essential hypertension
HTN (hypertension)
HTN (hypertension)

## 2017-12-18 NOTE — PROGRESS NOTE ADULT - ASSESSMENT
Patient presents as trauma B. Patient reportedly fell at home from standing while walking to bathroom and was found by family member on ground. No LOC.   Airway intact and stable   PT WITH FRACTURE OF LEFT HIP WENT TO OR    ALSO WITH RIGHT SHOULDER FRACTURE   HAD BEEN ON PLAVIX AT HOME     PT WITH CHRONIC STASIS DERMATITIS  OF BOTH LOWER  EXTREMITIES   CONSTIPATED  HAD SVT    ON CARDZIEM  OVERALL STABLE  WILL D/W ORTHO  EVENTUAL [facility]

## 2017-12-19 PROCEDURE — 99232 SBSQ HOSP IP/OBS MODERATE 35: CPT

## 2017-12-19 RX ADMIN — HYDROMORPHONE HYDROCHLORIDE 0.5 MILLIGRAM(S): 2 INJECTION INTRAMUSCULAR; INTRAVENOUS; SUBCUTANEOUS at 15:00

## 2017-12-19 RX ADMIN — OXYCODONE HYDROCHLORIDE 10 MILLIGRAM(S): 5 TABLET ORAL at 02:56

## 2017-12-19 RX ADMIN — OXYCODONE HYDROCHLORIDE 10 MILLIGRAM(S): 5 TABLET ORAL at 11:38

## 2017-12-19 RX ADMIN — GABAPENTIN 100 MILLIGRAM(S): 400 CAPSULE ORAL at 14:39

## 2017-12-19 RX ADMIN — Medication 100 MILLIGRAM(S): at 14:39

## 2017-12-19 RX ADMIN — HYDROMORPHONE HYDROCHLORIDE 0.5 MILLIGRAM(S): 2 INJECTION INTRAMUSCULAR; INTRAVENOUS; SUBCUTANEOUS at 14:38

## 2017-12-19 RX ADMIN — GABAPENTIN 100 MILLIGRAM(S): 400 CAPSULE ORAL at 21:39

## 2017-12-19 RX ADMIN — OXYCODONE HYDROCHLORIDE 10 MILLIGRAM(S): 5 TABLET ORAL at 12:38

## 2017-12-19 RX ADMIN — GABAPENTIN 100 MILLIGRAM(S): 400 CAPSULE ORAL at 06:30

## 2017-12-19 RX ADMIN — Medication 100 MILLIGRAM(S): at 06:30

## 2017-12-19 RX ADMIN — OXYCODONE HYDROCHLORIDE 10 MILLIGRAM(S): 5 TABLET ORAL at 02:26

## 2017-12-19 RX ADMIN — ENOXAPARIN SODIUM 40 MILLIGRAM(S): 100 INJECTION SUBCUTANEOUS at 11:38

## 2017-12-19 RX ADMIN — Medication 975 MILLIGRAM(S): at 14:39

## 2017-12-19 RX ADMIN — Medication 975 MILLIGRAM(S): at 15:40

## 2017-12-19 RX ADMIN — Medication 975 MILLIGRAM(S): at 21:39

## 2017-12-19 RX ADMIN — Medication 975 MILLIGRAM(S): at 22:30

## 2017-12-19 RX ADMIN — Medication 100 MILLIGRAM(S): at 21:39

## 2017-12-19 RX ADMIN — OXYCODONE HYDROCHLORIDE 10 MILLIGRAM(S): 5 TABLET ORAL at 18:30

## 2017-12-19 RX ADMIN — Medication 975 MILLIGRAM(S): at 06:30

## 2017-12-19 RX ADMIN — OXYCODONE HYDROCHLORIDE 10 MILLIGRAM(S): 5 TABLET ORAL at 17:40

## 2017-12-19 RX ADMIN — Medication 975 MILLIGRAM(S): at 07:00

## 2017-12-19 NOTE — PROGRESS NOTE ADULT - ASSESSMENT
Patient presents as trauma B. Patient reportedly fell at home from standing while walking to bathroom and was found by family member on ground. No LOC.   Airway intact and stable   PT WITH FRACTURE OF LEFT HIP WENT TO OR    ALSO WITH RIGHT SHOULDER FRACTURE   HAD BEEN ON PLAVIX AT HOME     PT WITH CHRONIC STASIS DERMATITIS  OF BOTH LOWER  EXTREMITIES   CONSTIPATED  HAD SVT    ON CARDZIEM  OVERALL STABLE  WILL D/W ORTHO  FOR SLING RIGHT ARM FOR COMFORT   EVENTUAL [facility]

## 2017-12-19 NOTE — PROGRESS NOTE ADULT - SUBJECTIVE AND OBJECTIVE BOX
[facility] is a 89y Female with HPI:  Patient presents as trauma B. Patient reportedly fell at home from standing while walking to bathroom and was found by family member on ground. No LOC.   Airway intact and stable   PT WITH FRACTURE OF LEFT HIP    S/P OR  AWAITING REHAB    Allergies:  No Known Allergies      Medications:  acetaminophen   Tablet. 650 milliGRAM(s) Oral every 6 hours PRN  docusate sodium 100 milliGRAM(s) Oral three times a day  docusate sodium 100 milliGRAM(s) Oral two times a day  enoxaparin Injectable 40 milliGRAM(s) SubCutaneous daily  HYDROmorphone  Injectable 0.5 milliGRAM(s) IV Push every 6 hours PRN  lactated ringers. 1000 milliLiter(s) IV Continuous <Continuous>  morphine  - Injectable 2 milliGRAM(s) IV Push every 4 hours PRN  ondansetron Injectable 4 milliGRAM(s) IV Push every 6 hours PRN  oxyCODONE    IR 10 milliGRAM(s) Oral every 4 hours PRN  oxyCODONE    IR 5 milliGRAM(s) Oral every 4 hours PRN      ANTIBIOTICS: NONE        Review of Systems: - Negative except as mentioned above     Physical Exam:     V Vital Signs Last 24 Hrs  T(C): 36.5 (19 Dec 2017 07:50), Max: 37.1 (18 Dec 2017 15:26)  T(F): 97.7 (19 Dec 2017 07:50), Max: 98.7 (18 Dec 2017 15:26)  HR: 84 (19 Dec 2017 07:50) (81 - 102)  BP: 141/65 (19 Dec 2017 07:50) (141/65 - 152/54)  BP(mean): --  RR: 18 (19 Dec 2017 07:50) (18 - 18)  SpO2: 92% (19 Dec 2017 07:50) (92% - 98%)      GEN: NAD, pleasant CONFUSED BUT KNOWS MY NAME  HEENT: normocephalic and atraumatic. EOMI. JOSE...  NECK: Supple. No carotid bruits.  No lymphadenopathy or thyromegaly.  LUNGS: Clear to auscultation.  HEART: Regular rate and rhythm without murmur.  ABDOMEN: Soft, nontender, and nondistended.  Positive bowel sounds.  No hepatosplenomegaly was noted.  NO REBOUND NO GUARDING  EXTREMITIES  POST OP RIGHT HIP   MILD EDEMA POS AREAS OF  ECCHYMOSIS      NEUROLOGIC:  AWAKE MID DEMENTIA    SKIN: No ulceration or induration present.      Labs:                       8.9    11.1  )-----------( 309      ( 18 Dec 2017 09:08 )             27.5   12-18    140  |  102  |  34.0<H>  ----------------------------<  109  4.1   |  26.0  |  0.74    Ca    9.0      18 Dec 2017 09:08    TPro  6.5<L>  /  Alb  3.2<L>  /  TBili  1.0  /  DBili  x   /  AST  35<H>  /  ALT  21  /  AlkPhos  64  12-18

## 2017-12-20 LAB
ALBUMIN SERPL ELPH-MCNC: 2.9 G/DL — LOW (ref 3.3–5.2)
ALP SERPL-CCNC: 71 U/L — SIGNIFICANT CHANGE UP (ref 40–120)
ALT FLD-CCNC: 19 U/L — SIGNIFICANT CHANGE UP
ANION GAP SERPL CALC-SCNC: 11 MMOL/L — SIGNIFICANT CHANGE UP (ref 5–17)
AST SERPL-CCNC: 26 U/L — SIGNIFICANT CHANGE UP
BILIRUB SERPL-MCNC: 1 MG/DL — SIGNIFICANT CHANGE UP (ref 0.4–2)
BUN SERPL-MCNC: 31 MG/DL — HIGH (ref 8–20)
CALCIUM SERPL-MCNC: 8.6 MG/DL — SIGNIFICANT CHANGE UP (ref 8.6–10.2)
CHLORIDE SERPL-SCNC: 101 MMOL/L — SIGNIFICANT CHANGE UP (ref 98–107)
CO2 SERPL-SCNC: 26 MMOL/L — SIGNIFICANT CHANGE UP (ref 22–29)
CREAT SERPL-MCNC: 0.75 MG/DL — SIGNIFICANT CHANGE UP (ref 0.5–1.3)
GLUCOSE SERPL-MCNC: 117 MG/DL — HIGH (ref 70–115)
HCT VFR BLD CALC: 27.3 % — LOW (ref 37–47)
HGB BLD-MCNC: 8.9 G/DL — LOW (ref 12–16)
MCHC RBC-ENTMCNC: 30.4 PG — SIGNIFICANT CHANGE UP (ref 27–31)
MCHC RBC-ENTMCNC: 32.6 G/DL — SIGNIFICANT CHANGE UP (ref 32–36)
MCV RBC AUTO: 93.2 FL — SIGNIFICANT CHANGE UP (ref 81–99)
PLATELET # BLD AUTO: 354 K/UL — SIGNIFICANT CHANGE UP (ref 150–400)
POTASSIUM SERPL-MCNC: 4.3 MMOL/L — SIGNIFICANT CHANGE UP (ref 3.5–5.3)
POTASSIUM SERPL-SCNC: 4.3 MMOL/L — SIGNIFICANT CHANGE UP (ref 3.5–5.3)
PROT SERPL-MCNC: 6 G/DL — LOW (ref 6.6–8.7)
RBC # BLD: 2.93 M/UL — LOW (ref 4.4–5.2)
RBC # FLD: 17.4 % — HIGH (ref 11–15.6)
SODIUM SERPL-SCNC: 138 MMOL/L — SIGNIFICANT CHANGE UP (ref 135–145)
WBC # BLD: 14.4 K/UL — HIGH (ref 4.8–10.8)
WBC # FLD AUTO: 14.4 K/UL — HIGH (ref 4.8–10.8)

## 2017-12-20 PROCEDURE — 99232 SBSQ HOSP IP/OBS MODERATE 35: CPT

## 2017-12-20 RX ORDER — HYDROMORPHONE HYDROCHLORIDE 2 MG/ML
0.5 INJECTION INTRAMUSCULAR; INTRAVENOUS; SUBCUTANEOUS EVERY 6 HOURS
Qty: 0 | Refills: 0 | Status: DISCONTINUED | OUTPATIENT
Start: 2017-12-20 | End: 2017-12-27

## 2017-12-20 RX ORDER — OXYCODONE HYDROCHLORIDE 5 MG/1
10 TABLET ORAL EVERY 4 HOURS
Qty: 0 | Refills: 0 | Status: DISCONTINUED | OUTPATIENT
Start: 2017-12-20 | End: 2017-12-26

## 2017-12-20 RX ADMIN — Medication 975 MILLIGRAM(S): at 06:28

## 2017-12-20 RX ADMIN — ENOXAPARIN SODIUM 40 MILLIGRAM(S): 100 INJECTION SUBCUTANEOUS at 13:09

## 2017-12-20 RX ADMIN — Medication 975 MILLIGRAM(S): at 13:08

## 2017-12-20 RX ADMIN — Medication 100 MILLIGRAM(S): at 17:33

## 2017-12-20 RX ADMIN — HYDROMORPHONE HYDROCHLORIDE 0.5 MILLIGRAM(S): 2 INJECTION INTRAMUSCULAR; INTRAVENOUS; SUBCUTANEOUS at 00:21

## 2017-12-20 RX ADMIN — Medication 975 MILLIGRAM(S): at 05:28

## 2017-12-20 RX ADMIN — GABAPENTIN 100 MILLIGRAM(S): 400 CAPSULE ORAL at 05:28

## 2017-12-20 RX ADMIN — Medication 975 MILLIGRAM(S): at 23:24

## 2017-12-20 RX ADMIN — Medication 975 MILLIGRAM(S): at 22:24

## 2017-12-20 RX ADMIN — Medication 100 MILLIGRAM(S): at 13:08

## 2017-12-20 RX ADMIN — GABAPENTIN 100 MILLIGRAM(S): 400 CAPSULE ORAL at 22:24

## 2017-12-20 RX ADMIN — Medication 100 MILLIGRAM(S): at 22:24

## 2017-12-20 RX ADMIN — GABAPENTIN 100 MILLIGRAM(S): 400 CAPSULE ORAL at 13:08

## 2017-12-20 RX ADMIN — Medication 100 MILLIGRAM(S): at 05:28

## 2017-12-20 RX ADMIN — OXYCODONE HYDROCHLORIDE 10 MILLIGRAM(S): 5 TABLET ORAL at 14:08

## 2017-12-20 RX ADMIN — Medication 975 MILLIGRAM(S): at 14:08

## 2017-12-20 RX ADMIN — HYDROMORPHONE HYDROCHLORIDE 0.5 MILLIGRAM(S): 2 INJECTION INTRAMUSCULAR; INTRAVENOUS; SUBCUTANEOUS at 00:36

## 2017-12-20 RX ADMIN — OXYCODONE HYDROCHLORIDE 10 MILLIGRAM(S): 5 TABLET ORAL at 13:08

## 2017-12-20 RX ADMIN — OXYCODONE HYDROCHLORIDE 10 MILLIGRAM(S): 5 TABLET ORAL at 17:34

## 2017-12-20 RX ADMIN — OXYCODONE HYDROCHLORIDE 10 MILLIGRAM(S): 5 TABLET ORAL at 18:34

## 2017-12-20 NOTE — PROGRESS NOTE ADULT - SUBJECTIVE AND OBJECTIVE BOX
[facility] is a 89y Female with HPI:  Patient presents as trauma B. Patient reportedly fell at home from standing while walking to bathroom and was found by family member on ground. No LOC.   Airway intact and stable   PT WITH FRACTURE OF LEFT HIP    S/P OR  AWAITING REHAB      Allergies:  No Known Allergies      Medications:  acetaminophen   Tablet. 650 milliGRAM(s) Oral every 6 hours PRN  docusate sodium 100 milliGRAM(s) Oral three times a day  docusate sodium 100 milliGRAM(s) Oral two times a day  enoxaparin Injectable 40 milliGRAM(s) SubCutaneous daily  HYDROmorphone  Injectable 0.5 milliGRAM(s) IV Push every 6 hours PRN  lactated ringers. 1000 milliLiter(s) IV Continuous <Continuous>  morphine  - Injectable 2 milliGRAM(s) IV Push every 4 hours PRN  ondansetron Injectable 4 milliGRAM(s) IV Push every 6 hours PRN  oxyCODONE    IR 10 milliGRAM(s) Oral every 4 hours PRN  oxyCODONE    IR 5 milliGRAM(s) Oral every 4 hours PRN      ANTIBIOTICS: NONE        Review of Systems: - Negative except as mentioned above     Physical Exam:      Vital Signs Last 24 Hrs  T(C): 37.1 (20 Dec 2017 07:36), Max: 37.2 (19 Dec 2017 15:53)  T(F): 98.8 (20 Dec 2017 07:36), Max: 99 (19 Dec 2017 15:53)  HR: 94 (20 Dec 2017 07:36) (92 - 104)  BP: 135/55 (20 Dec 2017 07:36) (130/66 - 138/60)  BP(mean): --  RR: 18 (20 Dec 2017 07:36) (18 - 18)  SpO2: 91% (20 Dec 2017 07:36) (91% - 97%)    GEN: NAD, pleasant CONFUSED BUT KNOWS MY NAME  HEENT: normocephalic and atraumatic. EOMI. JOSE...  NECK: Supple. No carotid bruits.  No lymphadenopathy or thyromegaly.  LUNGS: Clear to auscultation.  HEART: Regular rate and rhythm without murmur.  ABDOMEN: Soft, nontender, and nondistended.  Positive bowel sounds.  No hepatosplenomegaly was noted.  NO REBOUND NO GUARDING  EXTREMITIES  POST OP RIGHT HIP   MILD EDEMA POS AREAS OF  ECCHYMOSIS      NEUROLOGIC:  AWAKE MID DEMENTIA    SKIN: No ulceration or induration present.      Labs:                    pending

## 2017-12-20 NOTE — PROGRESS NOTE ADULT - ASSESSMENT
Patient presents as trauma B. Patient reportedly fell at home from standing while walking to bathroom and was found by family member on ground. No LOC.   Airway intact and stable   PT WITH FRACTURE OF LEFT HIP WENT TO OR    ALSO WITH RIGHT SHOULDER FRACTURE   HAD BEEN ON PLAVIX AT HOME     PT WITH CHRONIC STASIS DERMATITIS  OF BOTH LOWER  EXTREMITIES       SVT    ON CARDZIEM  OVERALL STABLE   WILL REPEAT LABS TODAY  POSSIBLE D/C TO [facility] IN 24-48HRS  URINE CX SENT TODAY

## 2017-12-21 LAB
HCT VFR BLD CALC: 27.6 % — LOW (ref 37–47)
HGB BLD-MCNC: 8.9 G/DL — LOW (ref 12–16)
MCHC RBC-ENTMCNC: 30.3 PG — SIGNIFICANT CHANGE UP (ref 27–31)
MCHC RBC-ENTMCNC: 32.2 G/DL — SIGNIFICANT CHANGE UP (ref 32–36)
MCV RBC AUTO: 93.9 FL — SIGNIFICANT CHANGE UP (ref 81–99)
PLATELET # BLD AUTO: 394 K/UL — SIGNIFICANT CHANGE UP (ref 150–400)
RBC # BLD: 2.94 M/UL — LOW (ref 4.4–5.2)
RBC # FLD: 17.9 % — HIGH (ref 11–15.6)
WBC # BLD: 16.1 K/UL — HIGH (ref 4.8–10.8)
WBC # FLD AUTO: 16.1 K/UL — HIGH (ref 4.8–10.8)

## 2017-12-21 PROCEDURE — 99232 SBSQ HOSP IP/OBS MODERATE 35: CPT

## 2017-12-21 RX ORDER — CEFTRIAXONE 500 MG/1
1 INJECTION, POWDER, FOR SOLUTION INTRAMUSCULAR; INTRAVENOUS ONCE
Qty: 0 | Refills: 0 | Status: COMPLETED | OUTPATIENT
Start: 2017-12-21 | End: 2017-12-21

## 2017-12-21 RX ORDER — CEFTRIAXONE 500 MG/1
INJECTION, POWDER, FOR SOLUTION INTRAMUSCULAR; INTRAVENOUS
Qty: 0 | Refills: 0 | Status: DISCONTINUED | OUTPATIENT
Start: 2017-12-21 | End: 2017-12-25

## 2017-12-21 RX ORDER — CEFTRIAXONE 500 MG/1
1 INJECTION, POWDER, FOR SOLUTION INTRAMUSCULAR; INTRAVENOUS EVERY 24 HOURS
Qty: 0 | Refills: 0 | Status: DISCONTINUED | OUTPATIENT
Start: 2017-12-22 | End: 2017-12-25

## 2017-12-21 RX ADMIN — Medication 100 MILLIGRAM(S): at 21:06

## 2017-12-21 RX ADMIN — Medication 975 MILLIGRAM(S): at 13:03

## 2017-12-21 RX ADMIN — GABAPENTIN 100 MILLIGRAM(S): 400 CAPSULE ORAL at 05:21

## 2017-12-21 RX ADMIN — Medication 100 MILLIGRAM(S): at 13:03

## 2017-12-21 RX ADMIN — Medication 975 MILLIGRAM(S): at 14:03

## 2017-12-21 RX ADMIN — ENOXAPARIN SODIUM 40 MILLIGRAM(S): 100 INJECTION SUBCUTANEOUS at 13:02

## 2017-12-21 RX ADMIN — OXYCODONE HYDROCHLORIDE 10 MILLIGRAM(S): 5 TABLET ORAL at 15:35

## 2017-12-21 RX ADMIN — Medication 100 MILLIGRAM(S): at 05:20

## 2017-12-21 RX ADMIN — GABAPENTIN 100 MILLIGRAM(S): 400 CAPSULE ORAL at 21:00

## 2017-12-21 RX ADMIN — Medication 975 MILLIGRAM(S): at 21:00

## 2017-12-21 RX ADMIN — Medication 975 MILLIGRAM(S): at 05:50

## 2017-12-21 RX ADMIN — GABAPENTIN 100 MILLIGRAM(S): 400 CAPSULE ORAL at 13:03

## 2017-12-21 RX ADMIN — CEFTRIAXONE 100 GRAM(S): 500 INJECTION, POWDER, FOR SOLUTION INTRAMUSCULAR; INTRAVENOUS at 10:02

## 2017-12-21 RX ADMIN — Medication 975 MILLIGRAM(S): at 05:20

## 2017-12-21 RX ADMIN — OXYCODONE HYDROCHLORIDE 10 MILLIGRAM(S): 5 TABLET ORAL at 05:20

## 2017-12-21 RX ADMIN — Medication 975 MILLIGRAM(S): at 22:12

## 2017-12-21 RX ADMIN — Medication 100 MILLIGRAM(S): at 18:39

## 2017-12-21 RX ADMIN — OXYCODONE HYDROCHLORIDE 10 MILLIGRAM(S): 5 TABLET ORAL at 16:05

## 2017-12-21 RX ADMIN — OXYCODONE HYDROCHLORIDE 10 MILLIGRAM(S): 5 TABLET ORAL at 06:20

## 2017-12-21 NOTE — PROGRESS NOTE ADULT - SUBJECTIVE AND OBJECTIVE BOX
[facility] is a 89y Female with HPI:  Patient presents as trauma B. Patient reportedly fell at home from standing while walking to bathroom and was found by family member on ground. No LOC.   Airway intact and stable   PT WITH FRACTURE OF LEFT HIP    S/P OR  AWAITING REHAB  URINE CX POSTIVE  GM NEG RODS      Allergies:  No Known Allergies      Medications:  acetaminophen   Tablet. 650 milliGRAM(s) Oral every 6 hours PRN  docusate sodium 100 milliGRAM(s) Oral three times a day  docusate sodium 100 milliGRAM(s) Oral two times a day  enoxaparin Injectable 40 milliGRAM(s) SubCutaneous daily  HYDROmorphone  Injectable 0.5 milliGRAM(s) IV Push every 6 hours PRN  lactated ringers. 1000 milliLiter(s) IV Continuous <Continuous>  morphine  - Injectable 2 milliGRAM(s) IV Push every 4 hours PRN  ondansetron Injectable 4 milliGRAM(s) IV Push every 6 hours PRN  oxyCODONE    IR 10 milliGRAM(s) Oral every 4 hours PRN  oxyCODONE    IR 5 milliGRAM(s) Oral every 4 hours PRN      ANTIBIOTICS: NONE        Review of Systems: - Negative except as mentioned above     Physical Exam:       Vital Signs Last 24 Hrs  T(C): 36.7 (21 Dec 2017 07:40), Max: 37.2 (20 Dec 2017 16:15)  T(F): 98 (21 Dec 2017 07:40), Max: 99 (20 Dec 2017 16:15)  HR: 81 (21 Dec 2017 07:40) (81 - 96)  BP: 130/65 (21 Dec 2017 07:40) (130/65 - 146/60)  BP(mean): --  RR: 18 (21 Dec 2017 07:40) (16 - 18)  SpO2: 91% (21 Dec 2017 07:40) (91% - 98%)    GEN: NAD, pleasant CONFUSED BUT KNOWS MY NAME  HEENT: normocephalic and atraumatic. EOMI. JOSE...  NECK: Supple. No carotid bruits.  No lymphadenopathy or thyromegaly.  LUNGS: Clear to auscultation.  HEART: Regular rate and rhythm without murmur.  ABDOMEN: Soft, nontender, and nondistended.  Positive bowel sounds.  No hepatosplenomegaly was noted.  NO REBOUND NO GUARDING  EXTREMITIES  POST OP RIGHT HIP   MILD EDEMA POS AREAS OF  ECCHYMOSIS      NEUROLOGIC:  AWAKE MID DEMENTIA    SKIN: No ulceration or induration present.      Labs:                    pending

## 2017-12-21 NOTE — PROGRESS NOTE ADULT - ASSESSMENT
Patient presents as trauma B. Patient reportedly fell at home from standing while walking to bathroom and was found by family member on ground. No LOC.   Airway intact and stable   PT WITH FRACTURE OF LEFT HIP WENT TO OR    ALSO WITH RIGHT SHOULDER FRACTURE   HAD BEEN ON PLAVIX AT HOME     PT WITH CHRONIC STASIS DERMATITIS  OF BOTH LOWER  EXTREMITIES       SVT    ON CARDZIEM  OVERALL STABLE   WILL REPEAT LABS TODAY  POSSIBLE D/C TO [facility] IN  AM  URINE CX  GM NEG RODS  WILL START ROCEPHIN

## 2017-12-22 LAB
-  AMIKACIN: SIGNIFICANT CHANGE UP
-  AMPICILLIN/SULBACTAM: SIGNIFICANT CHANGE UP
-  AMPICILLIN: SIGNIFICANT CHANGE UP
-  AZTREONAM: SIGNIFICANT CHANGE UP
-  CEFAZOLIN: SIGNIFICANT CHANGE UP
-  CEFEPIME: SIGNIFICANT CHANGE UP
-  CEFOXITIN: SIGNIFICANT CHANGE UP
-  CEFTAZIDIME: SIGNIFICANT CHANGE UP
-  CEFTRIAXONE: SIGNIFICANT CHANGE UP
-  CIPROFLOXACIN: SIGNIFICANT CHANGE UP
-  ERTAPENEM: SIGNIFICANT CHANGE UP
-  GENTAMICIN: SIGNIFICANT CHANGE UP
-  IMIPENEM: SIGNIFICANT CHANGE UP
-  LEVOFLOXACIN: SIGNIFICANT CHANGE UP
-  MEROPENEM: SIGNIFICANT CHANGE UP
-  NITROFURANTOIN: SIGNIFICANT CHANGE UP
-  PIPERACILLIN/TAZOBACTAM: SIGNIFICANT CHANGE UP
-  TOBRAMYCIN: SIGNIFICANT CHANGE UP
-  TRIMETHOPRIM/SULFAMETHOXAZOLE: SIGNIFICANT CHANGE UP
CULTURE RESULTS: SIGNIFICANT CHANGE UP
HCT VFR BLD CALC: 29.8 % — LOW (ref 37–47)
HGB BLD-MCNC: 9.7 G/DL — LOW (ref 12–16)
MCHC RBC-ENTMCNC: 31.1 PG — HIGH (ref 27–31)
MCHC RBC-ENTMCNC: 32.6 G/DL — SIGNIFICANT CHANGE UP (ref 32–36)
MCV RBC AUTO: 95.5 FL — SIGNIFICANT CHANGE UP (ref 81–99)
METHOD TYPE: SIGNIFICANT CHANGE UP
ORGANISM # SPEC MICROSCOPIC CNT: SIGNIFICANT CHANGE UP
ORGANISM # SPEC MICROSCOPIC CNT: SIGNIFICANT CHANGE UP
PLATELET # BLD AUTO: 460 K/UL — HIGH (ref 150–400)
PROCALCITONIN SERPL-MCNC: 0.31 NG/ML — HIGH (ref 0–0.04)
RBC # BLD: 3.12 M/UL — LOW (ref 4.4–5.2)
RBC # FLD: 18 % — HIGH (ref 11–15.6)
SPECIMEN SOURCE: SIGNIFICANT CHANGE UP
WBC # BLD: 17.4 K/UL — HIGH (ref 4.8–10.8)
WBC # FLD AUTO: 17.4 K/UL — HIGH (ref 4.8–10.8)

## 2017-12-22 PROCEDURE — 99233 SBSQ HOSP IP/OBS HIGH 50: CPT

## 2017-12-22 PROCEDURE — 71010: CPT | Mod: 26

## 2017-12-22 RX ORDER — DILTIAZEM HCL 120 MG
1 CAPSULE, EXT RELEASE 24 HR ORAL
Qty: 0 | Refills: 0 | COMMUNITY
Start: 2017-12-22

## 2017-12-22 RX ORDER — DOCUSATE SODIUM 100 MG
1 CAPSULE ORAL
Qty: 0 | Refills: 0 | COMMUNITY
Start: 2017-12-22

## 2017-12-22 RX ORDER — CEPHALEXIN 500 MG
500 CAPSULE ORAL EVERY 12 HOURS
Qty: 0 | Refills: 0 | Status: DISCONTINUED | OUTPATIENT
Start: 2017-12-22 | End: 2017-12-22

## 2017-12-22 RX ORDER — GABAPENTIN 400 MG/1
1 CAPSULE ORAL
Qty: 0 | Refills: 0 | COMMUNITY
Start: 2017-12-22

## 2017-12-22 RX ORDER — CEPHALEXIN 500 MG
1 CAPSULE ORAL
Qty: 0 | Refills: 0 | COMMUNITY
Start: 2017-12-22

## 2017-12-22 RX ORDER — OXYCODONE HYDROCHLORIDE 5 MG/1
1 TABLET ORAL
Qty: 0 | Refills: 0 | COMMUNITY
Start: 2017-12-22

## 2017-12-22 RX ADMIN — Medication 100 MILLIGRAM(S): at 13:47

## 2017-12-22 RX ADMIN — CEFTRIAXONE 100 GRAM(S): 500 INJECTION, POWDER, FOR SOLUTION INTRAMUSCULAR; INTRAVENOUS at 10:47

## 2017-12-22 RX ADMIN — ENOXAPARIN SODIUM 40 MILLIGRAM(S): 100 INJECTION SUBCUTANEOUS at 13:47

## 2017-12-22 RX ADMIN — GABAPENTIN 100 MILLIGRAM(S): 400 CAPSULE ORAL at 16:03

## 2017-12-22 RX ADMIN — Medication 100 MILLIGRAM(S): at 06:11

## 2017-12-22 RX ADMIN — OXYCODONE HYDROCHLORIDE 10 MILLIGRAM(S): 5 TABLET ORAL at 22:59

## 2017-12-22 RX ADMIN — Medication 975 MILLIGRAM(S): at 22:59

## 2017-12-22 RX ADMIN — GABAPENTIN 100 MILLIGRAM(S): 400 CAPSULE ORAL at 06:10

## 2017-12-22 RX ADMIN — Medication 975 MILLIGRAM(S): at 15:00

## 2017-12-22 RX ADMIN — Medication 975 MILLIGRAM(S): at 07:30

## 2017-12-22 RX ADMIN — GABAPENTIN 100 MILLIGRAM(S): 400 CAPSULE ORAL at 21:57

## 2017-12-22 RX ADMIN — Medication 975 MILLIGRAM(S): at 13:48

## 2017-12-22 RX ADMIN — OXYCODONE HYDROCHLORIDE 10 MILLIGRAM(S): 5 TABLET ORAL at 21:57

## 2017-12-22 RX ADMIN — Medication 975 MILLIGRAM(S): at 06:09

## 2017-12-22 RX ADMIN — Medication 975 MILLIGRAM(S): at 21:57

## 2017-12-22 NOTE — PROGRESS NOTE ADULT - SUBJECTIVE AND OBJECTIVE BOX
PT WSA FOR D/C TO [facility]   D/C [facility] DONE  HOWEVER WBC UP TO 17K  PT AWAKE NON TOXIC AFEBRILE  WILL DO Procalcitonin  AND CXR   WILL NEED TO FOLLOW UP CBC IN AM WILL DEFER D/C TODAY   WILL D/W DAUGHTER PT WAS FOR D/C TO [facility]  TODAY   D/C [facility] DONE  CAN BE FOUND ON DEC 15 AS WAS STARTED BY ORTHO TEAM  HOWEVER WBC UP TO 17K ON ROCEPHIN FOR UTI  PT AWAKE NON TOXIC AFEBRILE  WILL DO Procalcitonin  AND CXR   WILL NEED TO FOLLOW UP CBC IN AM WILL DEFER D/C TODAY   WILL D/W DAUGHTER

## 2017-12-23 LAB
HCT VFR BLD CALC: 29.3 % — LOW (ref 37–47)
HGB BLD-MCNC: 9.4 G/DL — LOW (ref 12–16)
MCHC RBC-ENTMCNC: 30.6 PG — SIGNIFICANT CHANGE UP (ref 27–31)
MCHC RBC-ENTMCNC: 32.1 G/DL — SIGNIFICANT CHANGE UP (ref 32–36)
MCV RBC AUTO: 95.4 FL — SIGNIFICANT CHANGE UP (ref 81–99)
PLATELET # BLD AUTO: 527 K/UL — HIGH (ref 150–400)
RBC # BLD: 3.07 M/UL — LOW (ref 4.4–5.2)
RBC # FLD: 18.3 % — HIGH (ref 11–15.6)
WBC # BLD: 16 K/UL — HIGH (ref 4.8–10.8)
WBC # FLD AUTO: 16 K/UL — HIGH (ref 4.8–10.8)

## 2017-12-23 PROCEDURE — 99232 SBSQ HOSP IP/OBS MODERATE 35: CPT

## 2017-12-23 RX ORDER — FUROSEMIDE 40 MG
20 TABLET ORAL ONCE
Qty: 0 | Refills: 0 | Status: COMPLETED | OUTPATIENT
Start: 2017-12-23 | End: 2017-12-23

## 2017-12-23 RX ADMIN — Medication 975 MILLIGRAM(S): at 22:51

## 2017-12-23 RX ADMIN — OXYCODONE HYDROCHLORIDE 10 MILLIGRAM(S): 5 TABLET ORAL at 07:37

## 2017-12-23 RX ADMIN — ENOXAPARIN SODIUM 40 MILLIGRAM(S): 100 INJECTION SUBCUTANEOUS at 12:41

## 2017-12-23 RX ADMIN — Medication 100 MILLIGRAM(S): at 16:21

## 2017-12-23 RX ADMIN — OXYCODONE HYDROCHLORIDE 10 MILLIGRAM(S): 5 TABLET ORAL at 16:21

## 2017-12-23 RX ADMIN — Medication 20 MILLIGRAM(S): at 12:41

## 2017-12-23 RX ADMIN — Medication 975 MILLIGRAM(S): at 05:52

## 2017-12-23 RX ADMIN — GABAPENTIN 100 MILLIGRAM(S): 400 CAPSULE ORAL at 16:22

## 2017-12-23 RX ADMIN — Medication 975 MILLIGRAM(S): at 16:21

## 2017-12-23 RX ADMIN — Medication 975 MILLIGRAM(S): at 07:35

## 2017-12-23 RX ADMIN — OXYCODONE HYDROCHLORIDE 10 MILLIGRAM(S): 5 TABLET ORAL at 06:51

## 2017-12-23 RX ADMIN — Medication 975 MILLIGRAM(S): at 21:51

## 2017-12-23 RX ADMIN — Medication 100 MILLIGRAM(S): at 21:51

## 2017-12-23 RX ADMIN — GABAPENTIN 100 MILLIGRAM(S): 400 CAPSULE ORAL at 21:51

## 2017-12-23 RX ADMIN — OXYCODONE HYDROCHLORIDE 10 MILLIGRAM(S): 5 TABLET ORAL at 12:41

## 2017-12-23 RX ADMIN — OXYCODONE HYDROCHLORIDE 10 MILLIGRAM(S): 5 TABLET ORAL at 16:51

## 2017-12-23 RX ADMIN — OXYCODONE HYDROCHLORIDE 10 MILLIGRAM(S): 5 TABLET ORAL at 13:11

## 2017-12-23 RX ADMIN — GABAPENTIN 100 MILLIGRAM(S): 400 CAPSULE ORAL at 05:52

## 2017-12-23 RX ADMIN — Medication 975 MILLIGRAM(S): at 16:51

## 2017-12-23 RX ADMIN — CEFTRIAXONE 100 GRAM(S): 500 INJECTION, POWDER, FOR SOLUTION INTRAMUSCULAR; INTRAVENOUS at 10:22

## 2017-12-23 NOTE — PROGRESS NOTE ADULT - ASSESSMENT
IMP    UTI    LEUKOCYTOSIS  POSS MILD FLUID OVERLOAD    PLAN  IF WBC DOWN IN AM WILL CHANGE TO PO VANTIN  LASX FOR POSS MILD CHF X 1

## 2017-12-23 NOTE — PROGRESS NOTE ADULT - SUBJECTIVE AND OBJECTIVE BOX
[facility] is a 89y Female with HPI:  Patient presents as trauma B. Patient reportedly fell at home from standing while walking to bathroom and was found by family member on ground. No LOC.   Airway intact and stable   PT WITH FRACTURE OF LEFT HIP    S/P OR  AWAITING REHAB  URINE CX POSTIVE  SENSI ECOLI  WBC PEAKED AND RT NL  SL SOB - LASIX X1          Allergies:  No Known Allergies      Medications:  acetaminophen   Tablet. 650 milliGRAM(s) Oral every 6 hours PRN  docusate sodium 100 milliGRAM(s) Oral three times a day  docusate sodium 100 milliGRAM(s) Oral two times a day  enoxaparin Injectable 40 milliGRAM(s) SubCutaneous daily  HYDROmorphone  Injectable 0.5 milliGRAM(s) IV Push every 6 hours PRN  lactated ringers. 1000 milliLiter(s) IV Continuous <Continuous>  morphine  - Injectable 2 milliGRAM(s) IV Push every 4 hours PRN  ondansetron Injectable 4 milliGRAM(s) IV Push every 6 hours PRN  oxyCODONE    IR 10 milliGRAM(s) Oral every 4 hours PRN  oxyCODONE    IR 5 milliGRAM(s) Oral every 4 hours PRN      ANTIBIOTICS: NONE        Review of Systems: - Negative except as mentioned above     Physical Exam:       Vital Signs Last 24 Hrs  T(C): 36.7 (21 Dec 2017 07:40), Max: 37.2 (20 Dec 2017 16:15)  T(F): 98 (21 Dec 2017 07:40), Max: 99 (20 Dec 2017 16:15)  HR: 81 (21 Dec 2017 07:40) (81 - 96)  BP: 130/65 (21 Dec 2017 07:40) (130/65 - 146/60)  BP(mean): --  RR: 18 (21 Dec 2017 07:40) (16 - 18)  SpO2: 91% (21 Dec 2017 07:40) (91% - 98%)    GEN: NAD, pleasant CONFUSED BUT KNOWS MY NAME  HEENT: normocephalic and atraumatic. EOMI. JOSE...  NECK: Supple. No carotid bruits.  No lymphadenopathy or thyromegaly.  LUNGS: Clear to auscultation.BASILAR RALES  HEART: Regular rate and rhythm without murmur.  ABDOMEN: Soft, nontender, and nondistended.  Positive bowel sounds.  No hepatosplenomegaly was noted.  NO REBOUND NO GUARDING  EXTREMITIES  POST OP RIGHT HIP   MILD EDEMA POS AREAS OF  ECCHYMOSIS      NEUROLOGIC:  AWAKE MID DEMENTIA    SKIN: No ulceration or induration present.      Labs:                    pending

## 2017-12-24 LAB
ANION GAP SERPL CALC-SCNC: 13 MMOL/L — SIGNIFICANT CHANGE UP (ref 5–17)
ANISOCYTOSIS BLD QL: SLIGHT — SIGNIFICANT CHANGE UP
BASOPHILS # BLD AUTO: 0 K/UL — SIGNIFICANT CHANGE UP (ref 0–0.2)
BASOPHILS NFR BLD AUTO: 0.5 % — SIGNIFICANT CHANGE UP (ref 0–2)
BUN SERPL-MCNC: 30 MG/DL — HIGH (ref 8–20)
CALCIUM SERPL-MCNC: 9.1 MG/DL — SIGNIFICANT CHANGE UP (ref 8.6–10.2)
CHLORIDE SERPL-SCNC: 100 MMOL/L — SIGNIFICANT CHANGE UP (ref 98–107)
CO2 SERPL-SCNC: 25 MMOL/L — SIGNIFICANT CHANGE UP (ref 22–29)
CREAT SERPL-MCNC: 0.73 MG/DL — SIGNIFICANT CHANGE UP (ref 0.5–1.3)
EOSINOPHIL # BLD AUTO: 0.3 K/UL — SIGNIFICANT CHANGE UP (ref 0–0.5)
EOSINOPHIL NFR BLD AUTO: 2.9 % — SIGNIFICANT CHANGE UP (ref 0–6)
GLUCOSE SERPL-MCNC: 98 MG/DL — SIGNIFICANT CHANGE UP (ref 70–115)
HCT VFR BLD CALC: 30.6 % — LOW (ref 37–47)
HGB BLD-MCNC: 10 G/DL — LOW (ref 12–16)
HYPOCHROMIA BLD QL: SLIGHT — SIGNIFICANT CHANGE UP
LYMPHOCYTES # BLD AUTO: 19.7 % — LOW (ref 20–55)
LYMPHOCYTES # BLD AUTO: 2.1 K/UL — SIGNIFICANT CHANGE UP (ref 1–4.8)
MACROCYTES BLD QL: SLIGHT — SIGNIFICANT CHANGE UP
MCHC RBC-ENTMCNC: 31.4 PG — HIGH (ref 27–31)
MCHC RBC-ENTMCNC: 32.7 G/DL — SIGNIFICANT CHANGE UP (ref 32–36)
MCV RBC AUTO: 96.2 FL — SIGNIFICANT CHANGE UP (ref 81–99)
MICROCYTES BLD QL: SLIGHT — SIGNIFICANT CHANGE UP
MONOCYTES # BLD AUTO: 1.2 K/UL — HIGH (ref 0–0.8)
MONOCYTES NFR BLD AUTO: 11.5 % — HIGH (ref 3–10)
NEUTROPHILS # BLD AUTO: 6.9 K/UL — SIGNIFICANT CHANGE UP (ref 1.8–8)
NEUTROPHILS NFR BLD AUTO: 65.1 % — SIGNIFICANT CHANGE UP (ref 37–73)
PLAT MORPH BLD: NORMAL — SIGNIFICANT CHANGE UP
PLATELET # BLD AUTO: 512 K/UL — HIGH (ref 150–400)
POIKILOCYTOSIS BLD QL AUTO: SLIGHT — SIGNIFICANT CHANGE UP
POTASSIUM SERPL-MCNC: 3.9 MMOL/L — SIGNIFICANT CHANGE UP (ref 3.5–5.3)
POTASSIUM SERPL-SCNC: 3.9 MMOL/L — SIGNIFICANT CHANGE UP (ref 3.5–5.3)
RBC # BLD: 3.18 M/UL — LOW (ref 4.4–5.2)
RBC # FLD: 18.5 % — HIGH (ref 11–15.6)
RBC BLD AUTO: ABNORMAL
SODIUM SERPL-SCNC: 138 MMOL/L — SIGNIFICANT CHANGE UP (ref 135–145)
WBC # BLD: 10.6 K/UL — SIGNIFICANT CHANGE UP (ref 4.8–10.8)
WBC # FLD AUTO: 10.6 K/UL — SIGNIFICANT CHANGE UP (ref 4.8–10.8)

## 2017-12-24 PROCEDURE — 99232 SBSQ HOSP IP/OBS MODERATE 35: CPT

## 2017-12-24 RX ORDER — CEFUROXIME AXETIL 250 MG
500 TABLET ORAL EVERY 12 HOURS
Qty: 0 | Refills: 0 | Status: DISCONTINUED | OUTPATIENT
Start: 2017-12-26 | End: 2017-12-27

## 2017-12-24 RX ORDER — SODIUM CHLORIDE 9 MG/ML
1000 INJECTION INTRAMUSCULAR; INTRAVENOUS; SUBCUTANEOUS
Qty: 0 | Refills: 0 | Status: DISCONTINUED | OUTPATIENT
Start: 2017-12-24 | End: 2017-12-27

## 2017-12-24 RX ADMIN — GABAPENTIN 100 MILLIGRAM(S): 400 CAPSULE ORAL at 16:29

## 2017-12-24 RX ADMIN — Medication 975 MILLIGRAM(S): at 17:00

## 2017-12-24 RX ADMIN — ENOXAPARIN SODIUM 40 MILLIGRAM(S): 100 INJECTION SUBCUTANEOUS at 11:51

## 2017-12-24 RX ADMIN — Medication 975 MILLIGRAM(S): at 21:27

## 2017-12-24 RX ADMIN — Medication 100 MILLIGRAM(S): at 05:17

## 2017-12-24 RX ADMIN — Medication 975 MILLIGRAM(S): at 16:29

## 2017-12-24 RX ADMIN — GABAPENTIN 100 MILLIGRAM(S): 400 CAPSULE ORAL at 21:27

## 2017-12-24 RX ADMIN — OXYCODONE HYDROCHLORIDE 10 MILLIGRAM(S): 5 TABLET ORAL at 07:21

## 2017-12-24 RX ADMIN — OXYCODONE HYDROCHLORIDE 10 MILLIGRAM(S): 5 TABLET ORAL at 22:27

## 2017-12-24 RX ADMIN — Medication 100 MILLIGRAM(S): at 21:28

## 2017-12-24 RX ADMIN — Medication 975 MILLIGRAM(S): at 05:17

## 2017-12-24 RX ADMIN — GABAPENTIN 100 MILLIGRAM(S): 400 CAPSULE ORAL at 05:17

## 2017-12-24 RX ADMIN — OXYCODONE HYDROCHLORIDE 10 MILLIGRAM(S): 5 TABLET ORAL at 01:35

## 2017-12-24 RX ADMIN — Medication 975 MILLIGRAM(S): at 06:18

## 2017-12-24 RX ADMIN — OXYCODONE HYDROCHLORIDE 10 MILLIGRAM(S): 5 TABLET ORAL at 21:27

## 2017-12-24 RX ADMIN — OXYCODONE HYDROCHLORIDE 10 MILLIGRAM(S): 5 TABLET ORAL at 06:26

## 2017-12-24 RX ADMIN — Medication 100 MILLIGRAM(S): at 16:29

## 2017-12-24 RX ADMIN — CEFTRIAXONE 100 GRAM(S): 500 INJECTION, POWDER, FOR SOLUTION INTRAMUSCULAR; INTRAVENOUS at 09:53

## 2017-12-24 RX ADMIN — SODIUM CHLORIDE 50 MILLILITER(S): 9 INJECTION INTRAMUSCULAR; INTRAVENOUS; SUBCUTANEOUS at 11:50

## 2017-12-24 RX ADMIN — Medication 975 MILLIGRAM(S): at 22:27

## 2017-12-24 RX ADMIN — OXYCODONE HYDROCHLORIDE 10 MILLIGRAM(S): 5 TABLET ORAL at 02:35

## 2017-12-24 NOTE — PROGRESS NOTE ADULT - ASSESSMENT
IMP    UTI    LEUKOCYTOSIS resolved  POSS MILD FLUID OVERLOAD    PLAN  IF WBC DOWN IN AM WILL CHANGE TO PO VANTIN  LASX FOR POSS MILD CHF X 1  poor po  chk labs po abs  swallow eval

## 2017-12-24 NOTE — PROGRESS NOTE ADULT - SUBJECTIVE AND OBJECTIVE BOX
[facility] [place] is a 89y Female with HPI:  Patient presents as trauma B. Patient reportedly fell at home from standing while walking to bathroom and was found by family member on ground. No LOC.   Airway intact and stable   PT WITH FRACTURE OF LEFT HIP    S/P OR  AWAITING REHAB  URINE CX POSTIVE  SENSI ECOLI  WBC PEAKED AND RT NL  SL SOB - LASIX X1    now poor po - ? dysphagia            Allergies:  No Known Allergies      Medications:  acetaminophen   Tablet. 650 milliGRAM(s) Oral every 6 hours PRN  docusate sodium 100 milliGRAM(s) Oral three times a day  docusate sodium 100 milliGRAM(s) Oral two times a day  enoxaparin Injectable 40 milliGRAM(s) SubCutaneous daily  HYDROmorphone  Injectable 0.5 milliGRAM(s) IV Push every 6 hours PRN  lactated ringers. 1000 milliLiter(s) IV Continuous <Continuous>  morphine  - Injectable 2 milliGRAM(s) IV Push every 4 hours PRN  ondansetron Injectable 4 milliGRAM(s) IV Push every 6 hours PRN  oxyCODONE    IR 10 milliGRAM(s) Oral every 4 hours PRN  oxyCODONE    IR 5 milliGRAM(s) Oral every 4 hours PRN      ANTIBIOTICS: NONE        Review of Systems: - Negative except as mentioned above     Physical Exam:       Vital Signs Last 24 Hrs  T(C): 36.7 (21 Dec 2017 07:40), Max: 37.2 (20 Dec 2017 16:15)  T(F): 98 (21 Dec 2017 07:40), Max: 99 (20 Dec 2017 16:15)  HR: 81 (21 Dec 2017 07:40) (81 - 96)  BP: 130/65 (21 Dec 2017 07:40) (130/65 - 146/60)  BP(mean): --  RR: 18 (21 Dec 2017 07:40) (16 - 18)  SpO2: 91% (21 Dec 2017 07:40) (91% - 98%)    GEN: NAD, pleasant CONFUSED BUT KNOWS MY NAME  HEENT: normocephalic and atraumatic. EOMI. JOSE...  NECK: Supple. No carotid bruits.  No lymphadenopathy or thyromegaly.  LUNGS: Clear to auscultation.BASILAR RALES  HEART: Regular rate and rhythm without murmur.  ABDOMEN: Soft, nontender, and nondistended.  Positive bowel sounds.  No hepatosplenomegaly was noted.  NO REBOUND NO GUARDING  EXTREMITIES  POST OP RIGHT HIP   MILD EDEMA POS AREAS OF  ECCHYMOSIS      NEUROLOGIC:  AWAKE MID DEMENTIA    SKIN: No ulceration or induration present.      Labs:                    pending

## 2017-12-24 NOTE — CHART NOTE - NSCHARTNOTEFT_GEN_A_CORE
Source: Patient [x ]  Family [ ]   other [ ]    Current Diet: DASH/ with ensure    Patient reports [ ] nausea  [ ] vomiting [ ] diarrhea [ ] constipation  [ ]chewing problems [ ] swallowing issues  [ ] other:     PO intake:  < 50% [ ]   50-75%  [ ]   %  [ ]  other :    Source for PO intake [ ] Patient [ ] family [ ] chart [ ] staff [ ] other    Enteral /Parenteral Nutrition:     Current Weight:     % Weight Change     Pertinent Medications: MEDICATIONS  (STANDING):  acetaminophen   Tablet. 975 milliGRAM(s) Oral every 8 hours  cefTRIAXone   IVPB      cefTRIAXone   IVPB 1 Gram(s) IV Intermittent every 24 hours  diltiazem    milliGRAM(s) Oral daily  docusate sodium 100 milliGRAM(s) Oral three times a day  enoxaparin Injectable 40 milliGRAM(s) SubCutaneous daily  gabapentin 100 milliGRAM(s) Oral three times a day    MEDICATIONS  (PRN):  HYDROmorphone  Injectable 0.5 milliGRAM(s) IV Push every 6 hours PRN for breakthrough pain  ondansetron Injectable 4 milliGRAM(s) IV Push every 6 hours PRN Nausea and/or Vomiting  oxyCODONE    IR 10 milliGRAM(s) Oral every 4 hours PRN Moderate Pain (4 - 6)    Pertinent Labs: CBC Full  -  ( 24 Dec 2017 05:18 )  WBC Count : 10.6 K/uL  Hemoglobin : 10.0 g/dL  Hematocrit : 30.6 %  Platelet Count - Automated : 512 K/uL  Mean Cell Volume : 96.2 fl  Mean Cell Hemoglobin : 31.4 pg  Mean Cell Hemoglobin Concentration : 32.7 g/dL  Auto Neutrophil # : 6.9 K/uL  Auto Lymphocyte # : 2.1 K/uL  Auto Monocyte # : 1.2 K/uL  Auto Eosinophil # : 0.3 K/uL  Auto Basophil # : 0.0 K/uL  Auto Neutrophil % : 65.1 %  Auto Lymphocyte % : 19.7 %  Auto Monocyte % : 11.5 %  Auto Eosinophil % : 2.9 %  Auto Basophil % : 0.5 %          Skin:     Nutrition focused physical exam conducted - found signs of malnutrition [ x]absent [ ]present    Subcutaneous fat loss: [ ] Orbital fat pads region, [ ]Buccal fat region, [ ]Triceps region,  [ ]Ribs region    Muscle wasting: [ ]Temples region, [ ]Clavicle region, [ ]Shoulder region, [ ]Scapula region, [ ]Interosseous region,  [ ]thigh region, [ ]Calf region    Estimated Needs:   [ ] no change since previous assessment  [ ] recalculated:     Current Nutrition Diagnosis: Pt presents at risk secondary to increased protein calorie needs, related to increased  physiologic demand of healing, as evidenced by s/p sx.      Recommendations:     Monitoring and Evaluation:   [ x] PO intake [ x] Tolerance to diet prescription [X] Weights  [X] Follow up per protocol [X] Labs:

## 2017-12-25 PROCEDURE — 99233 SBSQ HOSP IP/OBS HIGH 50: CPT

## 2017-12-25 RX ADMIN — ENOXAPARIN SODIUM 40 MILLIGRAM(S): 100 INJECTION SUBCUTANEOUS at 13:15

## 2017-12-25 RX ADMIN — Medication 975 MILLIGRAM(S): at 22:38

## 2017-12-25 RX ADMIN — Medication 975 MILLIGRAM(S): at 06:12

## 2017-12-25 RX ADMIN — Medication 975 MILLIGRAM(S): at 13:16

## 2017-12-25 RX ADMIN — Medication 975 MILLIGRAM(S): at 14:20

## 2017-12-25 RX ADMIN — GABAPENTIN 100 MILLIGRAM(S): 400 CAPSULE ORAL at 06:13

## 2017-12-25 RX ADMIN — GABAPENTIN 100 MILLIGRAM(S): 400 CAPSULE ORAL at 21:39

## 2017-12-25 RX ADMIN — GABAPENTIN 100 MILLIGRAM(S): 400 CAPSULE ORAL at 14:07

## 2017-12-25 RX ADMIN — OXYCODONE HYDROCHLORIDE 10 MILLIGRAM(S): 5 TABLET ORAL at 09:14

## 2017-12-25 RX ADMIN — Medication 100 MILLIGRAM(S): at 21:39

## 2017-12-25 RX ADMIN — Medication 975 MILLIGRAM(S): at 21:39

## 2017-12-25 RX ADMIN — Medication 100 MILLIGRAM(S): at 13:15

## 2017-12-25 RX ADMIN — SODIUM CHLORIDE 50 MILLILITER(S): 9 INJECTION INTRAMUSCULAR; INTRAVENOUS; SUBCUTANEOUS at 06:40

## 2017-12-25 RX ADMIN — OXYCODONE HYDROCHLORIDE 10 MILLIGRAM(S): 5 TABLET ORAL at 10:15

## 2017-12-25 RX ADMIN — Medication 100 MILLIGRAM(S): at 06:13

## 2017-12-25 NOTE — PROGRESS NOTE ADULT - ASSESSMENT
IMP    UTI    LEUKOCYTOSIS resolved  POSS MILD FLUID OVERLOAD NOW  RESOLVED         WBC DOWN IN AM  ON CEFTIN    LABS OK  SEEMS TO BE EATING OK BREAKFAST  WILL D/W CASE MANAGMENT  POSSIBLE D/C TO [facility]

## 2017-12-25 NOTE — PROGRESS NOTE ADULT - SUBJECTIVE AND OBJECTIVE BOX
[facility] is a 89y Female with HPI:  Patient presents as trauma B. Patient reportedly fell at home from standing while walking to bathroom and was found by family member on ground. No LOC.   Airway intact and stable   PT WITH FRACTURE OF LEFT HIP    S/P OR  AWAITING REHAB  URINE CX POSTIVE  SENSI ECOLI  WBC BACK TO NORMAL   SL SOB - LASIX X1  FEELS BETTER               Allergies:  No Known Allergies      Medications:  acetaminophen   Tablet. 650 milliGRAM(s) Oral every 6 hours PRN  docusate sodium 100 milliGRAM(s) Oral three times a day  docusate sodium 100 milliGRAM(s) Oral two times a day  enoxaparin Injectable 40 milliGRAM(s) SubCutaneous daily  HYDROmorphone  Injectable 0.5 milliGRAM(s) IV Push every 6 hours PRN  lactated ringers. 1000 milliLiter(s) IV Continuous <Continuous>  morphine  - Injectable 2 milliGRAM(s) IV Push every 4 hours PRN  ondansetron Injectable 4 milliGRAM(s) IV Push every 6 hours PRN  oxyCODONE    IR 10 milliGRAM(s) Oral every 4 hours PRN  oxyCODONE    IR 5 milliGRAM(s) Oral every 4 hours PRN      ANTIBIOTICS: NONE        Review of Systems: - Negative except as mentioned above     Physical Exam:       Vital Signs Last 24 Hrs   Vital Signs Last 24 Hrs  T(C): 36.4 (25 Dec 2017 08:01), Max: 36.8 (24 Dec 2017 15:00)  T(F): 97.6 (25 Dec 2017 08:01), Max: 98.3 (24 Dec 2017 15:00)  HR: 91 (25 Dec 2017 08:01) (66 - 91)  BP: 161/61 (25 Dec 2017 08:01) (137/62 - 161/61)  BP(mean): --  RR: 18 (25 Dec 2017 08:01) (17 - 18)  SpO2: 97% (25 Dec 2017 08:01) (96% - 99%)    GEN: NAD, pleasant CONFUSED BUT KNOWS MY NAME  HEENT: normocephalic and atraumatic. EOMI. JOSE...  NECK: Supple. No carotid bruits.  No lymphadenopathy or thyromegaly.  LUNGS: Clear to auscultation.BASILAR RALES  HEART: Regular rate and rhythm without murmur.  ABDOMEN: Soft, nontender, and nondistended.  Positive bowel sounds.  No hepatosplenomegaly was noted.  NO REBOUND NO GUARDING  EXTREMITIES  POST OP RIGHT HIP   MILD EDEMA POS AREAS OF  ECCHYMOSIS      NEUROLOGIC:  AWAKE MID DEMENTIA    SKIN: No ulceration or induration present.      Labs:                                         10.0   10.6  )-----------( 512      ( 24 Dec 2017 05:18 )             30.6

## 2017-12-26 DIAGNOSIS — N39.0 URINARY TRACT INFECTION, SITE NOT SPECIFIED: ICD-10-CM

## 2017-12-26 PROCEDURE — 99232 SBSQ HOSP IP/OBS MODERATE 35: CPT

## 2017-12-26 RX ORDER — TAMSULOSIN HYDROCHLORIDE 0.4 MG/1
0.4 CAPSULE ORAL DAILY
Qty: 0 | Refills: 0 | Status: DISCONTINUED | OUTPATIENT
Start: 2017-12-26 | End: 2017-12-27

## 2017-12-26 RX ORDER — BETHANECHOL CHLORIDE 25 MG
25 TABLET ORAL EVERY 12 HOURS
Qty: 0 | Refills: 0 | Status: DISCONTINUED | OUTPATIENT
Start: 2017-12-26 | End: 2017-12-27

## 2017-12-26 RX ADMIN — GABAPENTIN 100 MILLIGRAM(S): 400 CAPSULE ORAL at 05:35

## 2017-12-26 RX ADMIN — OXYCODONE HYDROCHLORIDE 10 MILLIGRAM(S): 5 TABLET ORAL at 00:01

## 2017-12-26 RX ADMIN — Medication 975 MILLIGRAM(S): at 06:05

## 2017-12-26 RX ADMIN — Medication 100 MILLIGRAM(S): at 05:35

## 2017-12-26 RX ADMIN — OXYCODONE HYDROCHLORIDE 10 MILLIGRAM(S): 5 TABLET ORAL at 06:13

## 2017-12-26 RX ADMIN — ENOXAPARIN SODIUM 40 MILLIGRAM(S): 100 INJECTION SUBCUTANEOUS at 12:14

## 2017-12-26 RX ADMIN — Medication 975 MILLIGRAM(S): at 14:58

## 2017-12-26 RX ADMIN — OXYCODONE HYDROCHLORIDE 10 MILLIGRAM(S): 5 TABLET ORAL at 13:15

## 2017-12-26 RX ADMIN — Medication 975 MILLIGRAM(S): at 21:04

## 2017-12-26 RX ADMIN — GABAPENTIN 100 MILLIGRAM(S): 400 CAPSULE ORAL at 21:04

## 2017-12-26 RX ADMIN — GABAPENTIN 100 MILLIGRAM(S): 400 CAPSULE ORAL at 14:58

## 2017-12-26 RX ADMIN — OXYCODONE HYDROCHLORIDE 10 MILLIGRAM(S): 5 TABLET ORAL at 12:13

## 2017-12-26 RX ADMIN — TAMSULOSIN HYDROCHLORIDE 0.4 MILLIGRAM(S): 0.4 CAPSULE ORAL at 21:04

## 2017-12-26 RX ADMIN — Medication 500 MILLIGRAM(S): at 05:35

## 2017-12-26 RX ADMIN — Medication 100 MILLIGRAM(S): at 14:58

## 2017-12-26 RX ADMIN — Medication 25 MILLIGRAM(S): at 17:48

## 2017-12-26 RX ADMIN — Medication 975 MILLIGRAM(S): at 05:35

## 2017-12-26 RX ADMIN — Medication 975 MILLIGRAM(S): at 22:00

## 2017-12-26 RX ADMIN — Medication 100 MILLIGRAM(S): at 21:04

## 2017-12-26 RX ADMIN — OXYCODONE HYDROCHLORIDE 10 MILLIGRAM(S): 5 TABLET ORAL at 00:46

## 2017-12-26 RX ADMIN — Medication 975 MILLIGRAM(S): at 15:15

## 2017-12-26 RX ADMIN — Medication 500 MILLIGRAM(S): at 17:48

## 2017-12-26 RX ADMIN — OXYCODONE HYDROCHLORIDE 10 MILLIGRAM(S): 5 TABLET ORAL at 07:30

## 2017-12-26 NOTE — CONSULT NOTE ADULT - SUBJECTIVE AND OBJECTIVE BOX
HPI:  Patient presents as trauma B. Patient reportedly fell at home from standing while walking to bathroom and was found by family member on ground. No LOC.   Airway intact and stable  Breath sounds clear and equal bilaterally, no respiratory distress  Circulation: 2+ pulses radial, femoral, dorsalis pedis  Disability: GCS 15, alert  Exposure: patient exposed (11 Dec 2017 03:21)    Patient was found to have a UTI and have high post void residuals up to 1100cc at one point.  Getting cic currently.  Poor historian.        PAST MEDICAL & SURGICAL HISTORY:  CAD (coronary artery disease)  HTN (hypertension)  S/P CABG (coronary artery bypass graft)      REVIEW OF SYSTEMS:     Reviewed h and p ros    MEDICATIONS  (STANDING):  acetaminophen   Tablet. 975 milliGRAM(s) Oral every 8 hours  bethanechol 25 milliGRAM(s) Oral every 12 hours  cefuroxime   Tablet 500 milliGRAM(s) Oral every 12 hours  diltiazem    milliGRAM(s) Oral daily  docusate sodium 100 milliGRAM(s) Oral three times a day  enoxaparin Injectable 40 milliGRAM(s) SubCutaneous daily  gabapentin 100 milliGRAM(s) Oral three times a day  sodium chloride 0.9%. 1000 milliLiter(s) (50 mL/Hr) IV Continuous <Continuous>  tamsulosin 0.4 milliGRAM(s) Oral daily    MEDICATIONS  (PRN):  HYDROmorphone  Injectable 0.5 milliGRAM(s) IV Push every 6 hours PRN for breakthrough pain  ondansetron Injectable 4 milliGRAM(s) IV Push every 6 hours PRN Nausea and/or Vomiting  oxyCODONE    IR 10 milliGRAM(s) Oral every 4 hours PRN Moderate Pain (4 - 6)      Allergies    No Known Allergies    Intolerances        SOCIAL HISTORY:    FAMILY HISTORY:      Vital Signs Last 24 Hrs  T(C): 36.5 (26 Dec 2017 08:00), Max: 37.1 (25 Dec 2017 15:30)  T(F): 97.7 (26 Dec 2017 08:00), Max: 98.8 (25 Dec 2017 23:30)  HR: 83 (26 Dec 2017 08:00) (83 - 98)  BP: 153/72 (26 Dec 2017 08:00) (140/59 - 168/80)  BP(mean): --  RR: 17 (26 Dec 2017 08:00) (17 - 18)  SpO2: 93% (26 Dec 2017 08:00) (92% - 95%)    PHYSICAL EXAM:    abd- soft, nt, nd, bs+, no masses    LABS:    12-24    138  |  100  |  30.0<H>  ----------------------------<  98  3.9   |  25.0  |  0.73    Ca    9.1      24 Dec 2017 13:10            RADIOLOGY & ADDITIONAL STUDIES:    CT- Urologic system wnl

## 2017-12-26 NOTE — SWALLOW BEDSIDE ASSESSMENT ADULT - COMMENTS
89 y/o F s/p fall at home with subtrochanteric fracture, R side. Hemodynamically stable. CT head, neck, abd/pelvis otherwise negative for acute injury. Pt declined solids, given above noted complaint

## 2017-12-26 NOTE — SWALLOW BEDSIDE ASSESSMENT ADULT - SWALLOW EVAL: DIAGNOSIS
Functional oral stage of swallow for puree & thin fluids. Pharyngeal stage clinically unremarkable, however, pt reports sensation of food & fluids "sitting/getting stuck" in upper esophagus, post intake, causing her to not be able to take additional PO. Assessment therefore, limited

## 2017-12-26 NOTE — SWALLOW BEDSIDE ASSESSMENT ADULT - ESOPHAGEAL PHASE
pt with c/o food sitting/getting stuck in upper esophagus pt with c/o liquid sitting/getting stuck in upper esophagus

## 2017-12-26 NOTE — PROGRESS NOTE ADULT - SUBJECTIVE AND OBJECTIVE BOX
[facility] is a 89y Female with HPI:  Patient presents as trauma B. Patient reportedly fell at home from standing while walking to bathroom and was found by family member on ground. No LOC.   Airway intact and stable   PT WITH FRACTURE OF LEFT HIP    S/P OR  AWAITING REHAB  URINE CX POSTIVE  SENSI ECOLI  WBC BACK TO NORMAL   SL SOB - LASIX X1  FEELS BETTER  SWALLOW EVAL WITH SOME SENSATION OF GETTING STUCK  RECOMMEND BARIUM SWALLOW               Allergies:  No Known Allergies      Medications:  acetaminophen   Tablet. 650 milliGRAM(s) Oral every 6 hours PRN  docusate sodium 100 milliGRAM(s) Oral three times a day  docusate sodium 100 milliGRAM(s) Oral two times a day  enoxaparin Injectable 40 milliGRAM(s) SubCutaneous daily  HYDROmorphone  Injectable 0.5 milliGRAM(s) IV Push every 6 hours PRN  lactated ringers. 1000 milliLiter(s) IV Continuous <Continuous>  morphine  - Injectable 2 milliGRAM(s) IV Push every 4 hours PRN  ondansetron Injectable 4 milliGRAM(s) IV Push every 6 hours PRN  oxyCODONE    IR 10 milliGRAM(s) Oral every 4 hours PRN  oxyCODONE    IR 5 milliGRAM(s) Oral every 4 hours PRN      ANTIBIOTICS: NONE        Review of Systems: - Negative except as mentioned above     Physical Exam:       V Vital Signs Last 24 Hrs  T(C): 36.5 (26 Dec 2017 08:00), Max: 37.1 (25 Dec 2017 15:30)  T(F): 97.7 (26 Dec 2017 08:00), Max: 98.8 (25 Dec 2017 23:30)  HR: 83 (26 Dec 2017 08:00) (83 - 98)  BP: 153/72 (26 Dec 2017 08:00) (140/59 - 168/80)  BP(mean): --  RR: 17 (26 Dec 2017 08:00) (17 - 18)  SpO2: 93% (26 Dec 2017 08:00) (92% - 95%)    GEN: NAD, pleasant CONFUSED BUT KNOWS MY NAME  HEENT: normocephalic and atraumatic. EOMI. JOSE...  NECK: Supple. No carotid bruits.  No lymphadenopathy or thyromegaly.  LUNGS: Clear to auscultation.BASILAR RALES  HEART: Regular rate and rhythm without murmur.  ABDOMEN: Soft, nontender, and nondistended.  Positive bowel sounds.  No hepatosplenomegaly was noted.  NO REBOUND NO GUARDING  EXTREMITIES  POST OP RIGHT HIP   MILD EDEMA POS AREAS OF  ECCHYMOSIS      NEUROLOGIC:  AWAKE MID DEMENTIA    SKIN: No ulceration or induration present.      Labs:                                         10.0   10.6  )-----------( 512      ( 24 Dec 2017 05:18 )             30.6

## 2017-12-26 NOTE — SWALLOW BEDSIDE ASSESSMENT ADULT - ASR SWALLOW ASPIRATION MONITOR
upper respiratory infection/oral hygiene/cough/gurgly voice/pneumonia/change of breathing pattern/position upright (89Y)/fever/throat clearing

## 2017-12-26 NOTE — SWALLOW BEDSIDE ASSESSMENT ADULT - SWALLOW EVAL: RECOMMENDED FEEDING/EATING TECHNIQUES
allow for swallow between intakes/crush medication (when feasible)/position upright (90 degrees)/small sips/bites

## 2017-12-26 NOTE — PROGRESS NOTE ADULT - ASSESSMENT
IMP    UTI    LEUKOCYTOSIS resolved  POSS MILD FLUID OVERLOAD NOW  RESOLVED         WBC DOWN IN AM  ON CEFTIN    LABS OK  SEEMS TO BE EATING OK BREAKFAST  BUT WITH SWALLOWING EVAL WITH RECOMMENDATION FOR BARIUM SWALLOW   WILLORDER IT  WILL ALSO HAVE UROLOGY EVAL; AS PT HAS BEEN GETTING STRAIGHT CATHETERIZATION  WILL D/W CASE MANAGEMENT    POSSIBLE D/C TO [facility]  IN AM

## 2017-12-26 NOTE — SWALLOW BEDSIDE ASSESSMENT ADULT - NS ASR SWALLOW FINDINGS DISCUS
NP Sana/Physician/Patient/Care Coordinator/Nursing Patient/Family/NP Sana; findings discussed with daughter via phone, as per request of MD/Physician/Nursing/Care Coordinator

## 2017-12-26 NOTE — CONSULT NOTE ADULT - PROBLEM SELECTOR RECOMMENDATION 9
treat uti    flomax daily    bethanochol 25mg po bid    Continue cic    If continues with high residuals may need to be discharged with an indwelling gabriel    will follow

## 2017-12-27 VITALS
TEMPERATURE: 98 F | HEART RATE: 84 BPM | RESPIRATION RATE: 18 BRPM | SYSTOLIC BLOOD PRESSURE: 132 MMHG | DIASTOLIC BLOOD PRESSURE: 59 MMHG | OXYGEN SATURATION: 93 %

## 2017-12-27 LAB
CULTURE RESULTS: SIGNIFICANT CHANGE UP
CULTURE RESULTS: SIGNIFICANT CHANGE UP
SPECIMEN SOURCE: SIGNIFICANT CHANGE UP
SPECIMEN SOURCE: SIGNIFICANT CHANGE UP

## 2017-12-27 PROCEDURE — 99232 SBSQ HOSP IP/OBS MODERATE 35: CPT

## 2017-12-27 PROCEDURE — 74220 X-RAY XM ESOPHAGUS 1CNTRST: CPT | Mod: 26

## 2017-12-27 RX ORDER — BETHANECHOL CHLORIDE 25 MG
1 TABLET ORAL
Qty: 0 | Refills: 0 | COMMUNITY
Start: 2017-12-27

## 2017-12-27 RX ORDER — TAMSULOSIN HYDROCHLORIDE 0.4 MG/1
1 CAPSULE ORAL
Qty: 0 | Refills: 0 | COMMUNITY
Start: 2017-12-27

## 2017-12-27 RX ADMIN — Medication 975 MILLIGRAM(S): at 13:40

## 2017-12-27 RX ADMIN — Medication 100 MILLIGRAM(S): at 13:12

## 2017-12-27 RX ADMIN — Medication 975 MILLIGRAM(S): at 13:12

## 2017-12-27 RX ADMIN — HYDROMORPHONE HYDROCHLORIDE 0.5 MILLIGRAM(S): 2 INJECTION INTRAMUSCULAR; INTRAVENOUS; SUBCUTANEOUS at 02:43

## 2017-12-27 RX ADMIN — ENOXAPARIN SODIUM 40 MILLIGRAM(S): 100 INJECTION SUBCUTANEOUS at 11:33

## 2017-12-27 RX ADMIN — GABAPENTIN 100 MILLIGRAM(S): 400 CAPSULE ORAL at 13:12

## 2017-12-27 RX ADMIN — Medication 975 MILLIGRAM(S): at 05:07

## 2017-12-27 RX ADMIN — Medication 25 MILLIGRAM(S): at 05:07

## 2017-12-27 RX ADMIN — TAMSULOSIN HYDROCHLORIDE 0.4 MILLIGRAM(S): 0.4 CAPSULE ORAL at 11:42

## 2017-12-27 RX ADMIN — Medication 100 MILLIGRAM(S): at 05:07

## 2017-12-27 RX ADMIN — HYDROMORPHONE HYDROCHLORIDE 0.5 MILLIGRAM(S): 2 INJECTION INTRAMUSCULAR; INTRAVENOUS; SUBCUTANEOUS at 10:50

## 2017-12-27 RX ADMIN — Medication 500 MILLIGRAM(S): at 05:08

## 2017-12-27 RX ADMIN — HYDROMORPHONE HYDROCHLORIDE 0.5 MILLIGRAM(S): 2 INJECTION INTRAMUSCULAR; INTRAVENOUS; SUBCUTANEOUS at 09:22

## 2017-12-27 RX ADMIN — GABAPENTIN 100 MILLIGRAM(S): 400 CAPSULE ORAL at 05:07

## 2017-12-27 RX ADMIN — Medication 975 MILLIGRAM(S): at 05:29

## 2017-12-27 RX ADMIN — HYDROMORPHONE HYDROCHLORIDE 0.5 MILLIGRAM(S): 2 INJECTION INTRAMUSCULAR; INTRAVENOUS; SUBCUTANEOUS at 02:55

## 2017-12-27 NOTE — PROGRESS NOTE ADULT - SUBJECTIVE AND OBJECTIVE BOX
PT S/P ESOPHOGRAM LIMITED  NO OBVIOUS MASS   NOTE APPRECIATED WILL PLACE MARKHAM AS UNLCEAR IF REHAB WILL DO BLADDER SCAN AND INTERMITTENT CATH  EVENTUAL TOV AT REHAB  D/C SUMMARY UPDATED FROM DEC 22 IT APPEARS IN NOTES 12/15   SPOKE TO DAUGHTER MOE ON PHONE TODAY

## 2017-12-27 NOTE — PROGRESS NOTE ADULT - SUBJECTIVE AND OBJECTIVE BOX
INTERVAL HPI/OVERNIGHT EVENTS:    MEDICATIONS  (STANDING):  acetaminophen   Tablet. 975 milliGRAM(s) Oral every 8 hours  bethanechol 25 milliGRAM(s) Oral every 12 hours  cefuroxime   Tablet 500 milliGRAM(s) Oral every 12 hours  diltiazem    milliGRAM(s) Oral daily  docusate sodium 100 milliGRAM(s) Oral three times a day  enoxaparin Injectable 40 milliGRAM(s) SubCutaneous daily  gabapentin 100 milliGRAM(s) Oral three times a day  sodium chloride 0.9%. 1000 milliLiter(s) (50 mL/Hr) IV Continuous <Continuous>  tamsulosin 0.4 milliGRAM(s) Oral daily    MEDICATIONS  (PRN):  HYDROmorphone  Injectable 0.5 milliGRAM(s) IV Push every 6 hours PRN for breakthrough pain  ondansetron Injectable 4 milliGRAM(s) IV Push every 6 hours PRN Nausea and/or Vomiting  oxyCODONE    IR 10 milliGRAM(s) Oral every 4 hours PRN Moderate Pain (4 - 6)      Allergies    No Known Allergies    Intolerances        Vital Signs Last 24 Hrs  T(C): 36.8 (26 Dec 2017 23:10), Max: 36.8 (26 Dec 2017 23:10)  T(F): 98.2 (26 Dec 2017 23:10), Max: 98.2 (26 Dec 2017 23:10)  HR: 88 (26 Dec 2017 23:10) (83 - 93)  BP: 162/66 (26 Dec 2017 23:10) (153/72 - 162/66)  BP(mean): --  RR: 18 (26 Dec 2017 23:10) (17 - 18)  SpO2: 90% (26 Dec 2017 23:10) (90% - 95%)     ON PE:  General: alert and awake  Abdomen: soft ND/NT No flank pain  : Bladder not distended.     LABS:                RADIOLOGY & ADDITIONAL TESTS:

## 2018-01-03 RX ORDER — DONEPEZIL HYDROCHLORIDE 10 MG/1
1 TABLET, FILM COATED ORAL
Qty: 0 | Refills: 0 | COMMUNITY

## 2018-01-03 RX ORDER — LOSARTAN POTASSIUM 100 MG/1
1 TABLET, FILM COATED ORAL
Qty: 0 | Refills: 0 | COMMUNITY

## 2018-01-03 RX ORDER — ASPIRIN/CALCIUM CARB/MAGNESIUM 324 MG
1 TABLET ORAL
Qty: 0 | Refills: 0 | COMMUNITY

## 2018-01-03 RX ORDER — CARBIDOPA AND LEVODOPA 25; 100 MG/1; MG/1
0 TABLET ORAL
Qty: 0 | Refills: 0 | COMMUNITY

## 2018-01-03 RX ORDER — CLOPIDOGREL BISULFATE 75 MG/1
1 TABLET, FILM COATED ORAL
Qty: 0 | Refills: 0 | COMMUNITY

## 2018-01-03 RX ORDER — SIMVASTATIN 20 MG/1
1 TABLET, FILM COATED ORAL
Qty: 0 | Refills: 0 | COMMUNITY

## 2019-05-24 NOTE — ED PROVIDER NOTE - PRINCIPAL DIAGNOSIS
Alert-The patient is alert, awake and responds to voice. The patient is oriented to time, place, and person. The triage nurse is able to obtain subjective information. Syncope and collapse

## 2020-12-20 NOTE — PRE-OP CHECKLIST - PATIENT'S PERSONAL PROPERTY GIVEN TO
Pt walked to room 4. Pt here with complaints of bilateral eye pain. Hurt, water, burns. Started a couple of days ago. Pt has been using otc meds, but are not helping. daughter/family member

## 2021-07-03 NOTE — PROGRESS NOTE ADULT - PROBLEM SELECTOR PROBLEM 2
Closed fracture of right hip, initial encounter
Coronary artery disease involving coronary bypass graft of native heart without angina pectoris
Postoperative pain
Postoperative pain
R/O Pain management
Patient/Collateral...

## 2023-05-04 NOTE — PROGRESS NOTE ADULT - PROVIDER SPECIALTY LIST ADULT
Cardiology
Hospitalist
Infectious Disease
Internal Medicine
Orthopedics
Pain Medicine
Pain Medicine
Urology
Anesthesia
no

## 2023-11-28 NOTE — PRE-OP CHECKLIST - AICD PRESENT
Spoke to mom. Mom stated that patient felt warm to touch Sunday. Mom stated that patient developed a fever of 102 degrees fahrenheit yesterday around noon. Mom stated that patient last temperature was 101.2 degrees fahrenheit. Mom reports that the patient also has a runny nose and cough. Per mom the cough makes the patient spit up or vomit. Mom stated that the patient is more tired. Denies any difficulty breathing. Decrease intake. Last output was about an hour ago. Mom stated that patient tugging at ear but usually does that when she cries. Advised mom to continue pushing fluids, fever control, and monitor for sign of dehydration. Mom verbalized understanding. Mom still requesting appointment for today or tomorrow. Patient was scheduled for an appointment today.    no

## 2023-12-01 NOTE — PROGRESS NOTE ADULT - PROBLEM SELECTOR PROBLEM 1
Closed fracture of proximal end of right humerus, unspecified fracture morphology, initial encounter Structured MSE

## 2024-10-12 NOTE — H&P ADULT - HISTORY OF PRESENT ILLNESS
Addended by: RANDALL CORRAL on: 10/12/2024 08:32 AM     Modules accepted: Orders    
Patient presents as trauma B. Patient reportedly fell at home from standing while walking to bathroom and was found by family member on ground. No LOC.   Airway intact and stable  Breath sounds clear and equal bilaterally, no respiratory distress  Circulation: 2+ pulses radial, femoral, dorsalis pedis  Disability: GCS 15, alert  Exposure: patient exposed

## 2025-04-02 NOTE — PROGRESS NOTE ADULT - ASSESSMENT
Urinary retention on ISC. Normal rate, regular rhythm.  Heart sounds S1, S2.  No murmurs, rubs or gallops.